# Patient Record
Sex: FEMALE | Race: WHITE | NOT HISPANIC OR LATINO | Employment: OTHER | URBAN - METROPOLITAN AREA
[De-identification: names, ages, dates, MRNs, and addresses within clinical notes are randomized per-mention and may not be internally consistent; named-entity substitution may affect disease eponyms.]

---

## 2017-01-04 ENCOUNTER — HOSPITAL ENCOUNTER (OUTPATIENT)
Dept: ONCOLOGY | Facility: CLINIC | Age: 62
Discharge: HOME | End: 2017-01-04
Attending: INTERNAL MEDICINE | Admitting: INTERNAL MEDICINE

## 2017-02-01 ENCOUNTER — HOSPITAL ENCOUNTER (OUTPATIENT)
Dept: ONCOLOGY | Facility: CLINIC | Age: 62
Discharge: HOME | End: 2017-02-01
Attending: INTERNAL MEDICINE | Admitting: INTERNAL MEDICINE

## 2017-03-02 ENCOUNTER — HOSPITAL ENCOUNTER (OUTPATIENT)
Dept: ONCOLOGY | Facility: CLINIC | Age: 62
Discharge: HOME | End: 2017-03-02
Attending: INTERNAL MEDICINE | Admitting: INTERNAL MEDICINE

## 2017-03-02 ENCOUNTER — HOSPITAL ENCOUNTER (OUTPATIENT)
Dept: GENERAL RADIOLOGY | Facility: HOSPITAL | Age: 62
Discharge: HOME | End: 2017-03-02
Attending: INTERNAL MEDICINE | Admitting: INTERNAL MEDICINE

## 2017-04-06 ENCOUNTER — HOSPITAL ENCOUNTER (OUTPATIENT)
Dept: ONCOLOGY | Facility: CLINIC | Age: 62
Discharge: HOME | End: 2017-04-06
Attending: INTERNAL MEDICINE | Admitting: INTERNAL MEDICINE

## 2017-05-02 ENCOUNTER — HOSPITAL ENCOUNTER (OUTPATIENT)
Dept: OTHER | Facility: HOSPITAL | Age: 62
Setting detail: SPECIMEN
Discharge: HOME | End: 2017-05-02
Attending: INTERNAL MEDICINE | Admitting: INTERNAL MEDICINE

## 2017-05-04 ENCOUNTER — HOSPITAL ENCOUNTER (OUTPATIENT)
Dept: ONCOLOGY | Facility: CLINIC | Age: 62
Discharge: HOME | End: 2017-05-04
Attending: NURSE PRACTITIONER | Admitting: NURSE PRACTITIONER

## 2017-06-08 ENCOUNTER — HOSPITAL ENCOUNTER (OUTPATIENT)
Dept: ONCOLOGY | Facility: CLINIC | Age: 62
Discharge: HOME | End: 2017-06-08
Attending: INTERNAL MEDICINE | Admitting: INTERNAL MEDICINE

## 2017-07-06 ENCOUNTER — HOSPITAL ENCOUNTER (OUTPATIENT)
Dept: ONCOLOGY | Facility: CLINIC | Age: 62
Discharge: HOME | End: 2017-07-06
Attending: INTERNAL MEDICINE | Admitting: INTERNAL MEDICINE

## 2017-08-03 ENCOUNTER — HOSPITAL ENCOUNTER (OUTPATIENT)
Dept: ONCOLOGY | Facility: CLINIC | Age: 62
Discharge: HOME | End: 2017-08-03
Attending: NURSE PRACTITIONER | Admitting: NURSE PRACTITIONER

## 2017-08-03 LAB
ALBUMIN SERPL-MCNC: 3.7 G/DL (ref 3.5–4.8)
ALBUMIN/GLOB SERPL: 1.2 {RATIO} (ref 1–1.7)
ALP SERPL-CCNC: 66 IU/L (ref 32–91)
ALT SERPL-CCNC: 19 IU/L (ref 14–54)
ANION GAP SERPL CALC-SCNC: 13.8 MMOL/L (ref 10–20)
AST SERPL-CCNC: 25 IU/L (ref 15–41)
BILIRUB SERPL-MCNC: 0.5 MG/DL (ref 0.3–1.2)
BUN SERPL-MCNC: 18 MG/DL (ref 8–20)
BUN/CREAT SERPL: 15 (ref 5.4–26.2)
CALCIUM SERPL-MCNC: 9.3 MG/DL (ref 8.9–10.3)
CHLORIDE SERPL-SCNC: 108 MMOL/L (ref 101–111)
CONV CO2: 23 MMOL/L (ref 22–32)
CONV TOTAL PROTEIN: 6.7 G/DL (ref 6.1–7.9)
CREAT UR-MCNC: 1.2 MG/DL (ref 0.4–1)
GLOBULIN UR ELPH-MCNC: 3 G/DL (ref 2.5–3.8)
GLUCOSE SERPL-MCNC: 185 MG/DL (ref 65–99)
POTASSIUM SERPL-SCNC: 3.8 MMOL/L (ref 3.6–5.1)
SODIUM SERPL-SCNC: 141 MMOL/L (ref 136–144)

## 2017-08-18 ENCOUNTER — HOSPITAL ENCOUNTER (OUTPATIENT)
Dept: MAMMOGRAPHY | Facility: HOSPITAL | Age: 62
Discharge: HOME | End: 2017-08-18
Attending: NURSE PRACTITIONER | Admitting: NURSE PRACTITIONER

## 2017-09-27 ENCOUNTER — HOSPITAL ENCOUNTER (OUTPATIENT)
Dept: ONCOLOGY | Facility: CLINIC | Age: 62
Setting detail: INFUSION SERIES
Discharge: HOME | End: 2017-09-27
Attending: INTERNAL MEDICINE | Admitting: INTERNAL MEDICINE

## 2017-09-27 ENCOUNTER — HOSPITAL ENCOUNTER (OUTPATIENT)
Dept: ONCOLOGY | Facility: HOSPITAL | Age: 62
Discharge: HOME | End: 2017-09-27
Attending: INTERNAL MEDICINE | Admitting: INTERNAL MEDICINE

## 2017-09-27 ENCOUNTER — CLINICAL SUPPORT (OUTPATIENT)
Dept: ONCOLOGY | Facility: HOSPITAL | Age: 62
End: 2017-09-27

## 2017-09-27 NOTE — PROGRESS NOTES
PATIENTS ONCOLOGY RECORD LOCATED IN [facility]      Subjective     Name:  PENELOPE MIRELES     Date:  2017  Address:  [address] [place], [place] [address] [place] [address]  Home: [phone]  :  1955 AGE:  62 y.o.        RECORDS OBTAINED:  Patients Oncology Record is located in [facility]

## 2017-10-25 ENCOUNTER — HOSPITAL ENCOUNTER (OUTPATIENT)
Dept: ONCOLOGY | Facility: CLINIC | Age: 62
Setting detail: INFUSION SERIES
Discharge: HOME | End: 2017-10-25
Attending: INTERNAL MEDICINE | Admitting: INTERNAL MEDICINE

## 2017-10-25 ENCOUNTER — CLINICAL SUPPORT (OUTPATIENT)
Dept: ONCOLOGY | Facility: HOSPITAL | Age: 62
End: 2017-10-25

## 2017-10-25 ENCOUNTER — HOSPITAL ENCOUNTER (OUTPATIENT)
Dept: ONCOLOGY | Facility: HOSPITAL | Age: 62
Discharge: HOME | End: 2017-10-25
Attending: INTERNAL MEDICINE | Admitting: INTERNAL MEDICINE

## 2017-10-25 NOTE — PROGRESS NOTES
PATIENTS ONCOLOGY RECORD LOCATED IN [facility]      Subjective     Name:  PENELOPE MIRELES     Date:  10/25/2017  Address:  [address] [place] [address]  Home: [phone]  :  1955 AGE:  62 y.o.        RECORDS OBTAINED:  Patients Oncology Record is located in [facility]

## 2017-10-27 ENCOUNTER — HOSPITAL ENCOUNTER (OUTPATIENT)
Dept: ULTRASOUND IMAGING | Facility: HOSPITAL | Age: 62
Discharge: HOME | End: 2017-10-27
Attending: OBSTETRICS & GYNECOLOGY | Admitting: OBSTETRICS & GYNECOLOGY

## 2017-11-21 ENCOUNTER — CLINICAL SUPPORT (OUTPATIENT)
Dept: ONCOLOGY | Facility: HOSPITAL | Age: 62
End: 2017-11-21

## 2017-11-21 ENCOUNTER — HOSPITAL ENCOUNTER (OUTPATIENT)
Dept: ONCOLOGY | Facility: CLINIC | Age: 62
Setting detail: INFUSION SERIES
Discharge: HOME | End: 2017-11-21
Attending: INTERNAL MEDICINE | Admitting: INTERNAL MEDICINE

## 2017-11-21 ENCOUNTER — HOSPITAL ENCOUNTER (OUTPATIENT)
Dept: ONCOLOGY | Facility: HOSPITAL | Age: 62
Discharge: HOME | End: 2017-11-21
Attending: INTERNAL MEDICINE | Admitting: INTERNAL MEDICINE

## 2017-11-21 NOTE — PROGRESS NOTES
PATIENTS ONCOLOGY RECORD LOCATED IN [facility]      Subjective     Name:  PENELOPE MIRELES     Date:  2017  Address:  [address] [place], [place] [address] [place] [address]  Home: [phone]  :  1955 AGE:  62 y.o.        RECORDS OBTAINED:  Patients Oncology Record is located in [facility]

## 2017-12-07 ENCOUNTER — HOSPITAL ENCOUNTER (OUTPATIENT)
Dept: OTHER | Facility: HOSPITAL | Age: 62
Discharge: HOME | End: 2017-12-07
Attending: OBSTETRICS & GYNECOLOGY | Admitting: OBSTETRICS & GYNECOLOGY

## 2017-12-07 LAB
ABO + RH BLD: NORMAL
ALBUMIN SERPL-MCNC: 3.7 G/DL (ref 3.5–4.8)
ALBUMIN/GLOB SERPL: 1.2 {RATIO} (ref 1–1.7)
ALP SERPL-CCNC: 67 IU/L (ref 32–91)
ALT SERPL-CCNC: 23 IU/L (ref 14–54)
ANION GAP SERPL CALC-SCNC: 10.9 MMOL/L (ref 10–20)
ARMBAND: NORMAL
AST SERPL-CCNC: 24 IU/L (ref 15–41)
BASOPHILS # BLD AUTO: 0.1 10*3/UL (ref 0–0.2)
BASOPHILS NFR BLD AUTO: 1 % (ref 0–2)
BILIRUB SERPL-MCNC: 0.3 MG/DL (ref 0.3–1.2)
BILIRUB UR QL STRIP: NEGATIVE MG/DL
BLD COMPONENT TYPE: NORMAL
BLD GP AB SCN SERPL QL: NEGATIVE
BUN SERPL-MCNC: 16 MG/DL (ref 8–20)
BUN/CREAT SERPL: 16 (ref 5.4–26.2)
CALCIUM SERPL-MCNC: 9.1 MG/DL (ref 8.9–10.3)
CASTS URNS QL MICRO: NORMAL /[LPF]
CHLORIDE SERPL-SCNC: 107 MMOL/L (ref 101–111)
COLOR UR: YELLOW
CONV BACTERIA IN URINE MICRO: NEGATIVE
CONV CLARITY OF URINE: CLEAR
CONV CO2: 26 MMOL/L (ref 22–32)
CONV HYALINE CASTS IN URINE MICRO: 4 /[LPF] (ref 0–5)
CONV PROTEIN IN URINE BY AUTOMATED TEST STRIP: NEGATIVE MG/DL
CONV SMALL ROUND CELLS: NORMAL /[HPF]
CONV TOTAL PROTEIN: 6.9 G/DL (ref 6.1–7.9)
CONV UROBILINOGEN IN URINE BY AUTOMATED TEST STRIP: 0.2 MG/DL
CREAT UR-MCNC: 1 MG/DL (ref 0.4–1)
CROSSMATCH EXPIRATION: NORMAL
CULTURE INDICATED?: NORMAL
DIFFERENTIAL METHOD BLD: (no result)
EOSINOPHIL # BLD AUTO: 0.1 10*3/UL (ref 0–0.3)
EOSINOPHIL # BLD AUTO: 1 % (ref 0–3)
ERYTHROCYTE [DISTWIDTH] IN BLOOD BY AUTOMATED COUNT: 14.8 % (ref 11.5–14.5)
GLOBULIN UR ELPH-MCNC: 3.2 G/DL (ref 2.5–3.8)
GLUCOSE SERPL-MCNC: 123 MG/DL (ref 65–99)
GLUCOSE UR QL: NEGATIVE MG/DL
HCT VFR BLD AUTO: 39.8 % (ref 35–49)
HGB BLD-MCNC: 13.4 G/DL (ref 12–15)
HGB UR QL STRIP: NEGATIVE
KETONES UR QL STRIP: NEGATIVE MG/DL
LEUKOCYTE ESTERASE UR QL STRIP: NEGATIVE
LYMPHOCYTES # BLD AUTO: 1 10*3/UL (ref 0.8–4.8)
LYMPHOCYTES NFR BLD AUTO: 16 % (ref 18–42)
MCH RBC QN AUTO: 29.3 PG (ref 26–32)
MCHC RBC AUTO-ENTMCNC: 33.7 G/DL (ref 32–36)
MCV RBC AUTO: 86.9 FL (ref 80–94)
MONOCYTES # BLD AUTO: 0.4 10*3/UL (ref 0.1–1.3)
MONOCYTES NFR BLD AUTO: 7 % (ref 2–11)
NEUTROPHILS # BLD AUTO: 4.4 10*3/UL (ref 2.3–8.6)
NEUTROPHILS NFR BLD AUTO: 75 % (ref 50–75)
NITRITE UR QL STRIP: NEGATIVE
NRBC BLD AUTO-RTO: 0 /100{WBCS}
NRBC/RBC NFR BLD MANUAL: 0 10*3/UL
PH UR STRIP.AUTO: 5 [PH] (ref 4.5–8)
PLATELET # BLD AUTO: 278 10*3/UL (ref 150–450)
PMV BLD AUTO: 7.7 FL (ref 7.4–10.4)
POTASSIUM SERPL-SCNC: 3.9 MMOL/L (ref 3.6–5.1)
RBC # BLD AUTO: 4.58 10*6/UL (ref 4–5.4)
RBC #/AREA URNS HPF: 1 /[HPF] (ref 0–3)
SODIUM SERPL-SCNC: 140 MMOL/L (ref 136–144)
SP GR UR: 1.03 (ref 1–1.03)
SPERM URNS QL MICRO: NORMAL /[HPF]
SQUAMOUS SPT QL MICRO: 5 /[HPF] (ref 0–5)
UNIDENT CRYS URNS QL MICRO: NORMAL /[HPF]
WBC # BLD AUTO: 5.9 10*3/UL (ref 4.5–11.5)
WBC #/AREA URNS HPF: 2 /[HPF] (ref 0–5)
YEAST SPEC QL WET PREP: NORMAL /[HPF]

## 2017-12-18 ENCOUNTER — HOSPITAL ENCOUNTER (OUTPATIENT)
Dept: OTHER | Facility: HOSPITAL | Age: 62
Setting detail: SPECIMEN
Discharge: HOME | End: 2017-12-18
Attending: OBSTETRICS & GYNECOLOGY | Admitting: OBSTETRICS & GYNECOLOGY

## 2017-12-19 ENCOUNTER — HOSPITAL ENCOUNTER (OUTPATIENT)
Dept: OTHER | Facility: HOSPITAL | Age: 62
Setting detail: SPECIMEN
Discharge: HOME | End: 2017-12-19
Attending: OBSTETRICS & GYNECOLOGY | Admitting: OBSTETRICS & GYNECOLOGY

## 2017-12-19 LAB
ABS VARIANT LYMPHS: 0.08 10*3/UL
CONV ABS BANDS: 0.3 10*3/UL
CONV ABS IMM GRAN: 0.17 10*3/UL
CONV ANISOCYTES: SLIGHT
CONV POLYCHROMASIA IN BLOOD BY LIGHT MICROSCOPY: SLIGHT
CONV VARIANT LYMPHOCYTES RELATIVE PERCENT BY MANUAL COUNT: 1 % (ref 0–1)
DIFFERENTIAL METHOD BLD: (no result)
ERYTHROCYTE [DISTWIDTH] IN BLOOD BY AUTOMATED COUNT: 15 % (ref 11.5–14.5)
HCT VFR BLD AUTO: 37.5 % (ref 35–49)
HGB BLD-MCNC: 12.2 G/DL (ref 12–15)
LYMPHOCYTES # BLD AUTO: 1.4 10*3/UL (ref 0.8–4.8)
LYMPHOCYTES NFR BLD AUTO: 17 % (ref 18–42)
MCH RBC QN AUTO: 28.6 PG (ref 26–32)
MCHC RBC AUTO-ENTMCNC: 32.5 G/DL (ref 32–36)
MCV RBC AUTO: 88.2 FL (ref 80–94)
METAMYELOCYTES NFR BLD: 2 %
MONOCYTES # BLD AUTO: 0.3 10*3/UL (ref 0.1–1.3)
MONOCYTES NFR BLD AUTO: 3 % (ref 2–11)
NEUTROPHILS # BLD AUTO: 6.1 10*3/UL (ref 2.3–8.6)
NEUTROPHILS NFR BLD AUTO: 73 % (ref 50–75)
NEUTS BAND NFR BLD MANUAL: 4 % (ref 0–5)
PLATELET # BLD AUTO: 283 10*3/UL (ref 150–450)
PMV BLD AUTO: 8.4 FL (ref 7.4–10.4)
RBC # BLD AUTO: 4.25 10*6/UL (ref 4–5.4)
WBC # BLD AUTO: 8.4 10*3/UL (ref 4.5–11.5)

## 2017-12-21 ENCOUNTER — HOSPITAL ENCOUNTER (OUTPATIENT)
Dept: ONCOLOGY | Facility: HOSPITAL | Age: 62
Discharge: HOME | End: 2017-12-21
Attending: INTERNAL MEDICINE | Admitting: INTERNAL MEDICINE

## 2017-12-21 ENCOUNTER — HOSPITAL ENCOUNTER (OUTPATIENT)
Dept: ONCOLOGY | Facility: CLINIC | Age: 62
Setting detail: INFUSION SERIES
Discharge: HOME | End: 2017-12-21
Attending: INTERNAL MEDICINE | Admitting: INTERNAL MEDICINE

## 2017-12-21 ENCOUNTER — CLINICAL SUPPORT (OUTPATIENT)
Dept: ONCOLOGY | Facility: HOSPITAL | Age: 62
End: 2017-12-21

## 2018-01-18 ENCOUNTER — HOSPITAL ENCOUNTER (OUTPATIENT)
Dept: ONCOLOGY | Facility: CLINIC | Age: 63
Setting detail: INFUSION SERIES
Discharge: HOME | End: 2018-01-18
Attending: INTERNAL MEDICINE | Admitting: INTERNAL MEDICINE

## 2018-01-18 ENCOUNTER — CLINICAL SUPPORT (OUTPATIENT)
Dept: ONCOLOGY | Facility: HOSPITAL | Age: 63
End: 2018-01-18

## 2018-01-18 NOTE — PROGRESS NOTES
PATIENTS ONCOLOGY RECORD LOCATED IN [facility]      Subjective     Name:  PENELOPE MIRELES     Date:  2018  [address] [place] [address]  Home: [phone]  :  1955 AGE:  62 y.o.        RECORDS OBTAINED:  Patients Oncology Record is located in [facility]

## 2018-02-20 ENCOUNTER — HOSPITAL ENCOUNTER (OUTPATIENT)
Dept: ONCOLOGY | Facility: CLINIC | Age: 63
Setting detail: INFUSION SERIES
Discharge: HOME | End: 2018-02-20
Attending: INTERNAL MEDICINE | Admitting: INTERNAL MEDICINE

## 2018-02-20 ENCOUNTER — CLINICAL SUPPORT (OUTPATIENT)
Dept: ONCOLOGY | Facility: HOSPITAL | Age: 63
End: 2018-02-20

## 2018-02-20 NOTE — PROGRESS NOTES
PATIENTS ONCOLOGY RECORD LOCATED IN [facility]      Subjective     Name:  PENELOPE MIRELES     Date:  2018  Address:  [address] [place], [place] [address] [place] [address]  Home: [phone]  :  1955 AGE:  62 y.o.        RECORDS OBTAINED:  Patients Oncology Record is located in [facility]

## 2018-03-20 ENCOUNTER — CLINICAL SUPPORT (OUTPATIENT)
Dept: ONCOLOGY | Facility: HOSPITAL | Age: 63
End: 2018-03-20

## 2018-03-20 ENCOUNTER — HOSPITAL ENCOUNTER (OUTPATIENT)
Dept: ONCOLOGY | Facility: CLINIC | Age: 63
Setting detail: INFUSION SERIES
Discharge: HOME | End: 2018-03-20
Attending: NURSE PRACTITIONER | Admitting: NURSE PRACTITIONER

## 2018-03-20 ENCOUNTER — HOSPITAL ENCOUNTER (OUTPATIENT)
Dept: ONCOLOGY | Facility: HOSPITAL | Age: 63
Discharge: HOME | End: 2018-03-20
Attending: NURSE PRACTITIONER | Admitting: NURSE PRACTITIONER

## 2018-03-20 NOTE — PROGRESS NOTES
PATIENTS ONCOLOGY RECORD LOCATED IN [facility]      Subjective     Name:  PENELOPE MIRELES     Date:  2018  Address:  [address] [place], [place] [address] [place] [address]  Home: [phone]  :  1955 AGE:  62 y.o.        RECORDS OBTAINED:  Patients Oncology Record is located in [facility]

## 2018-04-24 ENCOUNTER — HOSPITAL ENCOUNTER (OUTPATIENT)
Dept: ONCOLOGY | Facility: CLINIC | Age: 63
Setting detail: INFUSION SERIES
Discharge: HOME | End: 2018-04-24
Attending: INTERNAL MEDICINE | Admitting: INTERNAL MEDICINE

## 2018-05-03 ENCOUNTER — HOSPITAL ENCOUNTER (OUTPATIENT)
Dept: GENERAL RADIOLOGY | Facility: HOSPITAL | Age: 63
Discharge: HOME | End: 2018-05-03
Attending: FAMILY MEDICINE | Admitting: FAMILY MEDICINE

## 2018-05-22 ENCOUNTER — HOSPITAL ENCOUNTER (OUTPATIENT)
Dept: ONCOLOGY | Facility: CLINIC | Age: 63
Setting detail: INFUSION SERIES
Discharge: HOME | End: 2018-05-22
Attending: INTERNAL MEDICINE | Admitting: INTERNAL MEDICINE

## 2018-05-22 ENCOUNTER — CLINICAL SUPPORT (OUTPATIENT)
Dept: ONCOLOGY | Facility: HOSPITAL | Age: 63
End: 2018-05-22

## 2018-05-22 NOTE — PROGRESS NOTES
PATIENTS ONCOLOGY RECORD LOCATED IN [facility]      Subjective     Name:  PENELOPE MIRELES     Date:  2018  Address:  [address] [place] [address] [place] [address]  Home: [phone]  :  1955 AGE:  63 y.o.        RECORDS OBTAINED:  Patients Oncology Record is located in [facility]

## 2018-05-31 ENCOUNTER — HOSPITAL ENCOUNTER (OUTPATIENT)
Dept: CARDIOLOGY | Facility: HOSPITAL | Age: 63
Discharge: HOME | End: 2018-05-31

## 2018-06-21 ENCOUNTER — CLINICAL SUPPORT (OUTPATIENT)
Dept: ONCOLOGY | Facility: HOSPITAL | Age: 63
End: 2018-06-21

## 2018-06-21 ENCOUNTER — HOSPITAL ENCOUNTER (OUTPATIENT)
Dept: ONCOLOGY | Facility: CLINIC | Age: 63
Setting detail: INFUSION SERIES
Discharge: HOME | End: 2018-06-21
Attending: INTERNAL MEDICINE | Admitting: INTERNAL MEDICINE

## 2018-06-21 NOTE — PROGRESS NOTES
PATIENTS ONCOLOGY RECORD LOCATED IN [facility]      Subjective     Name:  PENELOPE MIRELES     Date:  2018  Address:  [address] [place], [place] [address] [place] [address]  Home: [phone]  :  1955 AGE:  63 y.o.        RECORDS OBTAINED:  Patients Oncology Record is located in [facility]

## 2018-08-28 ENCOUNTER — HOSPITAL ENCOUNTER (OUTPATIENT)
Dept: MAMMOGRAPHY | Facility: HOSPITAL | Age: 63
Discharge: HOME | End: 2018-08-28

## 2018-08-29 ENCOUNTER — CLINICAL SUPPORT (OUTPATIENT)
Dept: ONCOLOGY | Facility: HOSPITAL | Age: 63
End: 2018-08-29

## 2018-08-29 ENCOUNTER — HOSPITAL ENCOUNTER (OUTPATIENT)
Dept: ONCOLOGY | Facility: CLINIC | Age: 63
Setting detail: INFUSION SERIES
Discharge: HOME | End: 2018-08-29
Attending: INTERNAL MEDICINE | Admitting: INTERNAL MEDICINE

## 2018-08-29 NOTE — PROGRESS NOTES
PATIENTS ONCOLOGY RECORD LOCATED IN [facility]      Subjective     Name:  PENELOPE MIRELES     Date:  2018  Address:  [address] [place], [place] [address] [place] [address]  Home: [phone]  :  1955 AGE:  63 y.o.        RECORDS OBTAINED:  Patients Oncology Record is located in [facility]

## 2018-09-18 ENCOUNTER — HOSPITAL ENCOUNTER (OUTPATIENT)
Dept: PREADMISSION TESTING | Facility: HOSPITAL | Age: 63
Discharge: HOME | End: 2018-09-18
Attending: SURGERY | Admitting: SURGERY

## 2018-09-18 LAB
ANION GAP SERPL CALC-SCNC: 15.8 MMOL/L (ref 10–20)
BASOPHILS # BLD AUTO: 0.1 10*3/UL (ref 0–0.2)
BASOPHILS NFR BLD AUTO: 1 % (ref 0–2)
BUN SERPL-MCNC: 16 MG/DL (ref 8–20)
BUN/CREAT SERPL: 14.5 (ref 5.4–26.2)
CALCIUM SERPL-MCNC: 9.1 MG/DL (ref 8.9–10.3)
CHLORIDE SERPL-SCNC: 106 MMOL/L (ref 101–111)
CONV CO2: 22 MMOL/L (ref 22–32)
CREAT UR-MCNC: 1.1 MG/DL (ref 0.4–1)
DIFFERENTIAL METHOD BLD: (no result)
EOSINOPHIL # BLD AUTO: 0.1 10*3/UL (ref 0–0.3)
EOSINOPHIL # BLD AUTO: 1 % (ref 0–3)
ERYTHROCYTE [DISTWIDTH] IN BLOOD BY AUTOMATED COUNT: 14.6 % (ref 11.5–14.5)
GLUCOSE SERPL-MCNC: 180 MG/DL (ref 65–99)
HCT VFR BLD AUTO: 41 % (ref 35–49)
HGB BLD-MCNC: 13.3 G/DL (ref 12–15)
LYMPHOCYTES # BLD AUTO: 1.2 10*3/UL (ref 0.8–4.8)
LYMPHOCYTES NFR BLD AUTO: 17 % (ref 18–42)
MCH RBC QN AUTO: 28.3 PG (ref 26–32)
MCHC RBC AUTO-ENTMCNC: 32.5 G/DL (ref 32–36)
MCV RBC AUTO: 87 FL (ref 80–94)
MONOCYTES # BLD AUTO: 0.4 10*3/UL (ref 0.1–1.3)
MONOCYTES NFR BLD AUTO: 5 % (ref 2–11)
NEUTROPHILS # BLD AUTO: 5.5 10*3/UL (ref 2.3–8.6)
NEUTROPHILS NFR BLD AUTO: 76 % (ref 50–75)
NRBC BLD AUTO-RTO: 0 /100{WBCS}
NRBC/RBC NFR BLD MANUAL: 0 10*3/UL
PLATELET # BLD AUTO: 307 10*3/UL (ref 150–450)
PMV BLD AUTO: 7.5 FL (ref 7.4–10.4)
POTASSIUM SERPL-SCNC: 3.8 MMOL/L (ref 3.6–5.1)
RBC # BLD AUTO: 4.71 10*6/UL (ref 4–5.4)
SODIUM SERPL-SCNC: 140 MMOL/L (ref 136–144)
WBC # BLD AUTO: 7.2 10*3/UL (ref 4.5–11.5)

## 2018-09-26 ENCOUNTER — HOSPITAL ENCOUNTER (OUTPATIENT)
Dept: PREOP | Facility: HOSPITAL | Age: 63
Setting detail: HOSPITAL OUTPATIENT SURGERY
Discharge: HOME | End: 2018-09-26
Attending: SURGERY | Admitting: SURGERY

## 2018-09-26 LAB — GLUCOSE BLD-MCNC: 114 MG/DL (ref 70–105)

## 2019-02-27 ENCOUNTER — HOSPITAL ENCOUNTER (OUTPATIENT)
Dept: ONCOLOGY | Facility: HOSPITAL | Age: 64
Discharge: HOME | End: 2019-02-27

## 2019-02-27 ENCOUNTER — HOSPITAL ENCOUNTER (OUTPATIENT)
Dept: ONCOLOGY | Facility: CLINIC | Age: 64
Setting detail: INFUSION SERIES
Discharge: HOME | End: 2019-02-27
Attending: INTERNAL MEDICINE | Admitting: INTERNAL MEDICINE

## 2019-06-01 ENCOUNTER — TRANSCRIBE ORDERS (OUTPATIENT)
Dept: ADMINISTRATIVE | Facility: HOSPITAL | Age: 64
End: 2019-06-01

## 2019-06-01 DIAGNOSIS — C50.411 MALIGNANT NEOPLASM OF UPPER-OUTER QUADRANT OF RIGHT FEMALE BREAST, UNSPECIFIED ESTROGEN RECEPTOR STATUS (HCC): Primary | ICD-10-CM

## 2019-06-01 DIAGNOSIS — M25.511 RIGHT SHOULDER PAIN, UNSPECIFIED CHRONICITY: ICD-10-CM

## 2019-06-01 DIAGNOSIS — G47.00 INSOMNIA, UNSPECIFIED TYPE: ICD-10-CM

## 2019-06-01 DIAGNOSIS — C64.1 MALIGNANT NEOPLASM OF RIGHT KIDNEY, EXCEPT RENAL PELVIS (HCC): ICD-10-CM

## 2019-06-21 ENCOUNTER — TELEPHONE (OUTPATIENT)
Dept: FAMILY MEDICINE CLINIC | Facility: CLINIC | Age: 64
End: 2019-06-21

## 2019-06-21 ENCOUNTER — OFFICE VISIT (OUTPATIENT)
Dept: FAMILY MEDICINE CLINIC | Facility: CLINIC | Age: 64
End: 2019-06-21

## 2019-06-21 VITALS
SYSTOLIC BLOOD PRESSURE: 137 MMHG | OXYGEN SATURATION: 96 % | BODY MASS INDEX: 42.3 KG/M2 | HEART RATE: 83 BPM | DIASTOLIC BLOOD PRESSURE: 85 MMHG | TEMPERATURE: 98 F | RESPIRATION RATE: 17 BRPM | HEIGHT: 64 IN | WEIGHT: 247.8 LBS

## 2019-06-21 DIAGNOSIS — G89.29 CHRONIC PAIN OF BOTH KNEES: ICD-10-CM

## 2019-06-21 DIAGNOSIS — C64.1 CANCER OF KIDNEY, RIGHT (HCC): ICD-10-CM

## 2019-06-21 DIAGNOSIS — M25.561 CHRONIC PAIN OF BOTH KNEES: ICD-10-CM

## 2019-06-21 DIAGNOSIS — M54.41 CHRONIC BILATERAL LOW BACK PAIN WITH BILATERAL SCIATICA: ICD-10-CM

## 2019-06-21 DIAGNOSIS — M25.561 CHRONIC PAIN OF RIGHT KNEE: ICD-10-CM

## 2019-06-21 DIAGNOSIS — M79.89 LEG SWELLING: ICD-10-CM

## 2019-06-21 DIAGNOSIS — G89.29 CHRONIC PAIN OF RIGHT KNEE: ICD-10-CM

## 2019-06-21 DIAGNOSIS — H93.13 TINNITUS OF BOTH EARS: Primary | ICD-10-CM

## 2019-06-21 DIAGNOSIS — M54.42 CHRONIC BILATERAL LOW BACK PAIN WITH BILATERAL SCIATICA: ICD-10-CM

## 2019-06-21 DIAGNOSIS — C50.919 MALIGNANT NEOPLASM OF FEMALE BREAST, UNSPECIFIED ESTROGEN RECEPTOR STATUS, UNSPECIFIED LATERALITY, UNSPECIFIED SITE OF BREAST (HCC): ICD-10-CM

## 2019-06-21 DIAGNOSIS — C50.411 MALIGNANT NEOPLASM OF UPPER-OUTER QUADRANT OF RIGHT FEMALE BREAST, UNSPECIFIED ESTROGEN RECEPTOR STATUS (HCC): ICD-10-CM

## 2019-06-21 DIAGNOSIS — G89.29 CHRONIC BILATERAL LOW BACK PAIN WITH BILATERAL SCIATICA: ICD-10-CM

## 2019-06-21 DIAGNOSIS — M25.562 CHRONIC PAIN OF BOTH KNEES: ICD-10-CM

## 2019-06-21 PROBLEM — D64.9 ANEMIA: Status: ACTIVE | Noted: 2019-06-21

## 2019-06-21 PROBLEM — M54.2 NECK PAIN, ACUTE: Status: ACTIVE | Noted: 2019-06-21

## 2019-06-21 PROBLEM — M79.604 RIGHT LEG PAIN: Status: ACTIVE | Noted: 2019-06-21

## 2019-06-21 PROBLEM — M47.27 OSTEOARTHRITIS OF LUMBOSACRAL SPINE WITH RADICULOPATHY: Status: ACTIVE | Noted: 2018-08-15

## 2019-06-21 PROBLEM — M19.90 ARTHRITIS: Status: ACTIVE | Noted: 2019-06-21

## 2019-06-21 PROBLEM — M47.812 CERVICAL SPONDYLOSIS WITHOUT MYELOPATHY: Status: ACTIVE | Noted: 2018-08-15

## 2019-06-21 PROBLEM — M54.50 LOW BACK PAIN: Status: ACTIVE | Noted: 2019-06-21

## 2019-06-21 PROBLEM — Z23 NEED FOR IMMUNIZATION AGAINST INFLUENZA: Status: RESOLVED | Noted: 2019-06-21 | Resolved: 2019-06-21

## 2019-06-21 PROBLEM — H10.9 CONJUNCTIVITIS: Status: ACTIVE | Noted: 2017-04-15

## 2019-06-21 PROBLEM — Z23 NEED FOR IMMUNIZATION AGAINST INFLUENZA: Status: ACTIVE | Noted: 2019-06-21

## 2019-06-21 PROCEDURE — 99214 OFFICE O/P EST MOD 30 MIN: CPT | Performed by: FAMILY MEDICINE

## 2019-06-21 PROCEDURE — 20610 DRAIN/INJ JOINT/BURSA W/O US: CPT | Performed by: FAMILY MEDICINE

## 2019-06-21 RX ORDER — METHYLPREDNISOLONE ACETATE 80 MG/ML
80 INJECTION, SUSPENSION INTRA-ARTICULAR; INTRALESIONAL; INTRAMUSCULAR; SOFT TISSUE ONCE
Status: COMPLETED | OUTPATIENT
Start: 2019-06-21 | End: 2019-06-21

## 2019-06-21 RX ORDER — GABAPENTIN 300 MG/1
1 CAPSULE ORAL 3 TIMES DAILY
COMMUNITY
Start: 2016-10-27 | End: 2019-07-17

## 2019-06-21 RX ORDER — LIDOCAINE HYDROCHLORIDE 10 MG/ML
1 INJECTION, SOLUTION INFILTRATION; PERINEURAL ONCE
Status: COMPLETED | OUTPATIENT
Start: 2019-06-21 | End: 2019-06-21

## 2019-06-21 RX ORDER — BUPIVACAINE HYDROCHLORIDE 5 MG/ML
1 INJECTION, SOLUTION PERINEURAL ONCE
Status: COMPLETED | OUTPATIENT
Start: 2019-06-21 | End: 2019-06-21

## 2019-06-21 RX ADMIN — LIDOCAINE HYDROCHLORIDE 1 ML: 10 INJECTION, SOLUTION INFILTRATION; PERINEURAL at 11:49

## 2019-06-21 RX ADMIN — METHYLPREDNISOLONE ACETATE 80 MG: 80 INJECTION, SUSPENSION INTRA-ARTICULAR; INTRALESIONAL; INTRAMUSCULAR; SOFT TISSUE at 11:50

## 2019-06-21 RX ADMIN — BUPIVACAINE HYDROCHLORIDE 1 ML: 5 INJECTION, SOLUTION PERINEURAL at 11:46

## 2019-06-21 NOTE — ASSESSMENT & PLAN NOTE
Renal condition is unchanged.  Continue current treatment regimen.  Regular aerobic exercise.  Renal condition will be reassessed in 3 months.

## 2019-06-21 NOTE — PROGRESS NOTES
Procedure   Arthrocentesis  Date/Time: 6/21/2019 11:57 AM  Performed by: Yonathan Camargo Sr., MD  Authorized by: Yonathan Camargo Sr., MD   Indications: joint swelling and pain   Body area: knee  Joint: right knee  Local anesthesia used: no    Anesthesia:  Local anesthesia used: no    Sedation:  Patient sedated: no    Needle gauge: 25G by 1.5in.  Ultrasound guidance: no  Approach: medial  Patient tolerance: Patient tolerated the procedure well with no immediate complications

## 2019-06-22 NOTE — ASSESSMENT & PLAN NOTE
Last chemo was Jan 2016 and Feb was last radiation.   She will be following up  She sees Dr. Charles seen Q3 months.  She has had a mammogram that was normal.  She stopped taking her medication due to the side effects.  She was strongly advised to restart her medicine.

## 2019-06-22 NOTE — ASSESSMENT & PLAN NOTE
Her swelling on her legs are about the same and there is no pain.  This is likely just edema due to poor circulation.   She was given an injection in the right knee.

## 2019-06-22 NOTE — ASSESSMENT & PLAN NOTE
This is likely her hearing worsening.  She was encouraged to be seen by ENT but she declined at this time.

## 2019-06-22 NOTE — ASSESSMENT & PLAN NOTE
She was advised to call and set up an appt with Dr. Galaviz for her back pain and consider an MRI.  She was given back exercises and encouraged to use heat on her back.

## 2019-07-17 ENCOUNTER — OFFICE VISIT (OUTPATIENT)
Dept: ORTHOPEDIC SURGERY | Facility: CLINIC | Age: 64
End: 2019-07-17

## 2019-07-17 VITALS
WEIGHT: 246.2 LBS | DIASTOLIC BLOOD PRESSURE: 83 MMHG | HEIGHT: 64 IN | BODY MASS INDEX: 42.03 KG/M2 | HEART RATE: 57 BPM | SYSTOLIC BLOOD PRESSURE: 133 MMHG

## 2019-07-17 DIAGNOSIS — M47.26 OTHER SPONDYLOSIS WITH RADICULOPATHY, LUMBAR REGION: Primary | ICD-10-CM

## 2019-07-17 PROCEDURE — 99213 OFFICE O/P EST LOW 20 MIN: CPT | Performed by: PHYSICIAN ASSISTANT

## 2019-07-17 RX ORDER — LETROZOLE 2.5 MG/1
2.5 TABLET, FILM COATED ORAL DAILY
Refills: 3 | COMMUNITY
Start: 2019-06-21 | End: 2019-08-12 | Stop reason: SINTOL

## 2019-07-17 RX ORDER — GABAPENTIN 300 MG/1
300 CAPSULE ORAL 2 TIMES DAILY
Qty: 60 CAPSULE | Refills: 0 | Status: SHIPPED | OUTPATIENT
Start: 2019-07-17 | End: 2019-07-24

## 2019-07-17 NOTE — PROGRESS NOTES
Orthopedic Spine Follow Up    Referring Provider: No ref. provider found  Primary Care Provider: Yonathan Camargo Sr., MD    Patient Name: Celi Ordaz  Patient Age: 64 y.o.  Chief Complaint: had concerns including Pain of the Lumbar Spine and Follow-up (Here to get MRI scheduled).    History of Present Illness: Celi Ordaz is a 64 y.o. year old female here in  Follow up today with chief complaint of had concerns including Pain of the Lumbar Spine and Follow-up (Here to get MRI scheduled)..The patient complains of axial low back pain with radiation to right lower extremity for several years.Patient reports no known injury.  Patient has has recurrent self limited episodes of low back pain in the past and previous osteoarthritis of lumbar spine. Pain is described as worsening, intermittent and with weight bearing.  Pain is alleviated by heat and rest.  Previous treatments for this issue include none.  Because of the progressive nature the problem is really wearing her.  Over the last several months she is gotten really bad and 3 months ago she had a bout of severe right buttock and leg pain.  This is improved somewhat but it still there and she can tell she has severe stiffness in the morning, but with little activity worse with overactivity.      Imaging:  We reviewed the lateral x-rays from 2018 today in the office which demonstrate multilevel disc degeneration with profound collapse of L4-5 and L5-S1, backing phenomenon at L4-5 associated facet arthropathy.  No scoliosis.    Assessment:   1. Other spondylosis with radiculopathy, lumbar region        Plan:  Physical therapy, continue gabapentin, follow-up in a month and if pain persists we will order an MRI at that time to plan for epidural injection    Subjective     Review of Systems   Constitutional: Positive for activity change. Negative for fatigue and fever.   HENT: Negative for sore throat.    Eyes: Negative for double vision.   Respiratory: Negative for  choking.    Gastrointestinal: Negative for abdominal pain and constipation.   Musculoskeletal: Positive for back pain and gait problem.   Neurological: Positive for numbness.   Hematological: Does not bruise/bleed easily.   Psychiatric/Behavioral: Positive for sleep disturbance and depressed mood.       The following portions of the patient's history were reviewed and updated as appropriate: allergies, current medications, past family history, past medical history, past social history, past surgical history and problem list.    Objective     Physical Exam   Constitutional: She is oriented to person, place, and time. She appears well-developed.   HENT:   Head: Normocephalic.   Eyes: Conjunctivae are normal.   Neck: Normal range of motion.   Pulmonary/Chest: Effort normal.   Musculoskeletal: She exhibits tenderness.        Right hip: Normal.        Left hip: Normal.        Lumbar back: She exhibits decreased range of motion, tenderness and pain.   Neurological: She is oriented to person, place, and time.   Skin: Skin is warm.   Vitals reviewed.    Back Exam     Tenderness   The patient is experiencing tenderness in the lumbar.    Range of Motion   Extension: abnormal   Flexion: abnormal   Lateral bend right: normal   Lateral bend left: normal   Rotation right: normal   Rotation left: normal     Muscle Strength   The patient has normal back strength.    Tests   Straight leg raise right: negative  Straight leg raise left: negative    Other   Toe walk: normal  Heel walk: normal              1. Other spondylosis with radiculopathy, lumbar region         Orders Placed This Encounter   Procedures   • Ambulatory Referral to Physical Therapy Evaluate and treat; Soft Tissue Mobilizaton; ROM     Referral Priority:   Routine     Referral Type:   Therapy     Referral Reason:   Specialty Services Required     Requested Specialty:   Physical Therapy     Number of Visits Requested:   1       Procedures

## 2019-07-24 RX ORDER — GABAPENTIN 300 MG/1
CAPSULE ORAL
Qty: 60 CAPSULE | Refills: 0 | Status: SHIPPED | OUTPATIENT
Start: 2019-07-24 | End: 2020-08-03

## 2019-08-05 ENCOUNTER — HOSPITAL ENCOUNTER (OUTPATIENT)
Dept: MAMMOGRAPHY | Facility: HOSPITAL | Age: 64
Discharge: HOME | End: 2019-08-05
Admitting: INTERNAL MEDICINE

## 2019-08-05 DIAGNOSIS — G47.00 INSOMNIA, UNSPECIFIED TYPE: ICD-10-CM

## 2019-08-05 DIAGNOSIS — M25.511 RIGHT SHOULDER PAIN, UNSPECIFIED CHRONICITY: ICD-10-CM

## 2019-08-05 DIAGNOSIS — C50.411 MALIGNANT NEOPLASM OF UPPER-OUTER QUADRANT OF RIGHT FEMALE BREAST, UNSPECIFIED ESTROGEN RECEPTOR STATUS (HCC): ICD-10-CM

## 2019-08-05 DIAGNOSIS — C64.1 MALIGNANT NEOPLASM OF RIGHT KIDNEY, EXCEPT RENAL PELVIS (HCC): ICD-10-CM

## 2019-08-05 PROCEDURE — 77067 SCR MAMMO BI INCL CAD: CPT

## 2019-08-05 PROCEDURE — 77063 BREAST TOMOSYNTHESIS BI: CPT

## 2019-08-12 ENCOUNTER — APPOINTMENT (OUTPATIENT)
Dept: LAB | Facility: HOSPITAL | Age: 64
End: 2019-08-12

## 2019-08-12 ENCOUNTER — OFFICE VISIT (OUTPATIENT)
Dept: ONCOLOGY | Facility: CLINIC | Age: 64
End: 2019-08-12

## 2019-08-12 VITALS
DIASTOLIC BLOOD PRESSURE: 84 MMHG | WEIGHT: 246.6 LBS | HEIGHT: 64 IN | TEMPERATURE: 98.8 F | SYSTOLIC BLOOD PRESSURE: 147 MMHG | RESPIRATION RATE: 20 BRPM | HEART RATE: 72 BPM | BODY MASS INDEX: 42.1 KG/M2

## 2019-08-12 DIAGNOSIS — Z79.811 AROMATASE INHIBITOR USE: ICD-10-CM

## 2019-08-12 DIAGNOSIS — C64.1 CANCER OF KIDNEY, RIGHT (HCC): ICD-10-CM

## 2019-08-12 DIAGNOSIS — C50.411 MALIGNANT NEOPLASM OF UPPER-OUTER QUADRANT OF RIGHT FEMALE BREAST, UNSPECIFIED ESTROGEN RECEPTOR STATUS (HCC): Primary | ICD-10-CM

## 2019-08-12 LAB
ALBUMIN SERPL-MCNC: 3.7 G/DL (ref 3.5–4.8)
ALBUMIN/GLOB SERPL: 1.2 G/DL (ref 1–1.7)
ALP SERPL-CCNC: 76 U/L (ref 32–91)
ALT SERPL W P-5'-P-CCNC: 21 U/L (ref 14–54)
ANION GAP SERPL CALCULATED.3IONS-SCNC: 16.2 MMOL/L (ref 5–15)
AST SERPL-CCNC: 24 U/L (ref 15–41)
BASOPHILS # BLD AUTO: 0.07 10*3/MM3 (ref 0–0.2)
BASOPHILS NFR BLD AUTO: 0.9 % (ref 0–1.5)
BILIRUB SERPL-MCNC: 0.6 MG/DL (ref 0.3–1.2)
BUN BLD-MCNC: 16 MG/DL (ref 8–20)
BUN/CREAT SERPL: 17.8 (ref 5.4–26.2)
CALCIUM SPEC-SCNC: 9 MG/DL (ref 8.9–10.3)
CHLORIDE SERPL-SCNC: 105 MMOL/L (ref 101–111)
CO2 SERPL-SCNC: 22 MMOL/L (ref 22–32)
CREAT BLD-MCNC: 0.9 MG/DL (ref 0.4–1)
DEPRECATED RDW RBC AUTO: 49.6 FL (ref 37–54)
EOSINOPHIL # BLD AUTO: 0.12 10*3/MM3 (ref 0–0.4)
EOSINOPHIL NFR BLD AUTO: 1.6 % (ref 0.3–6.2)
ERYTHROCYTE [DISTWIDTH] IN BLOOD BY AUTOMATED COUNT: 15.5 % (ref 12.3–15.4)
GFR SERPL CREATININE-BSD FRML MDRD: 63 ML/MIN/1.73
GLOBULIN UR ELPH-MCNC: 3.1 GM/DL (ref 2.5–3.8)
GLUCOSE BLD-MCNC: 126 MG/DL (ref 65–99)
HCT VFR BLD AUTO: 42.9 % (ref 34–46.6)
HGB BLD-MCNC: 13.4 G/DL (ref 12–15.9)
LYMPHOCYTES # BLD AUTO: 1.4 10*3/MM3 (ref 0.7–3.1)
LYMPHOCYTES NFR BLD AUTO: 18.3 % (ref 19.6–45.3)
MCH RBC QN AUTO: 27.7 PG (ref 26.6–33)
MCHC RBC AUTO-ENTMCNC: 31.2 G/DL (ref 31.5–35.7)
MCV RBC AUTO: 88.8 FL (ref 79–97)
MONOCYTES # BLD AUTO: 0.47 10*3/MM3 (ref 0.1–0.9)
MONOCYTES NFR BLD AUTO: 6.2 % (ref 5–12)
NEUTROPHILS # BLD AUTO: 5.57 10*3/MM3 (ref 1.7–7)
NEUTROPHILS NFR BLD AUTO: 73 % (ref 42.7–76)
PLATELET # BLD AUTO: 303 10*3/MM3 (ref 140–450)
PMV BLD AUTO: 8.7 FL (ref 6–12)
POTASSIUM BLD-SCNC: 4.2 MMOL/L (ref 3.6–5.1)
PROT SERPL-MCNC: 6.8 G/DL (ref 6.1–7.9)
RBC # BLD AUTO: 4.83 10*6/MM3 (ref 3.77–5.28)
SODIUM BLD-SCNC: 139 MMOL/L (ref 136–144)
WBC NRBC COR # BLD: 7.63 10*3/MM3 (ref 3.4–10.8)

## 2019-08-12 PROCEDURE — 36415 COLL VENOUS BLD VENIPUNCTURE: CPT | Performed by: NURSE PRACTITIONER

## 2019-08-12 PROCEDURE — 82306 VITAMIN D 25 HYDROXY: CPT | Performed by: NURSE PRACTITIONER

## 2019-08-12 PROCEDURE — 85025 COMPLETE CBC W/AUTO DIFF WBC: CPT | Performed by: NURSE PRACTITIONER

## 2019-08-12 PROCEDURE — 80053 COMPREHEN METABOLIC PANEL: CPT | Performed by: NURSE PRACTITIONER

## 2019-08-12 PROCEDURE — 99213 OFFICE O/P EST LOW 20 MIN: CPT | Performed by: NURSE PRACTITIONER

## 2019-08-12 RX ORDER — MULTIVIT-MIN/FOLIC ACID/LUTEIN 500-250MCG
600 TABLET,CHEWABLE ORAL DAILY
Qty: 30 EACH | Refills: 6 | Status: CANCELLED | OUTPATIENT
Start: 2019-08-12

## 2019-08-12 NOTE — PROGRESS NOTES
Hematology/Oncology Outpatient Follow Up    PATIENT NAME:Celi Ordaz  :1955  MRN: [id number]  PRIMARY CARE PHYSICIAN: Yonathan Camargo Sr., MD  REFERRING PHYSICIAN: Yonathan Camargo Sr., MD    Chief Complaint   Patient presents with   • Follow-up     Stage IIA invasive lobular and LCIS of the right upper outer quadrant of the breast,Stage I clear cell renal cell cancer on the right, Left chest wall cyst         HISTORY OF PRESENT ILLNESS:   1.  Stage IIA (pT2,N0) invasive lobular cancer and lobular carcinoma in situ of the right upper outer quadrant breast diagnosed 2015.    • The patient felt a lump in her breast and went to her OB-GYN, Dr. Cavazos, and was sent for a mammogram.  The mammogram showed that the right breast was stable with asymmetric breast tissue in the upper outer right breast.  There was no definitive abnormality in the region of the area of concern.  An ultrasound was done which noted a palpable abnormality.  It was a spiculated shadow, 2.4 x 2.1 cm mass abutting the chest wall with angular margins at the 10 o’clock position suspicious for malignancy.  The remainder of the right breast was mammographically stable.  She was then referred to Dr. Dove.  On 7/29/15, she underwent a cholecystectomy, open, and a right breast mass excision.  Pathology showed invasive lobular carcinoma, tumor size 2.3 cm.  There was no DCIS present, but there was LCIS present.  Tumor focally extended to the margins.  Pathological staging was pT2pNX.  ER receptor positive, strong, 90% of cells.  OR receptor positive, strong, 70% of cells.  HER2/doris was negative, not amplified.  On 8/12/15, she underwent an MRI of bilateral breasts.  There was no abnormal enhancement seen in the left breast.  A single right axillary lymph node showed mild cortical thickening.  Post surgical changes were seen in the right breast.  It was felt that the thickening in the right axillary node was due to reactive hyperplasia.  No  other abnormality seen.  BRCA testing was done on 8/17/15.  There was no mutation detected in BRCA1 or BRCA2.  Shortly, thereafter, she sought a second opinion with Dr. Chun at the [facility] who reviewed the pathology slides and verified that there was tumor at the margin of the lumpectomy.  On 9/14/15, she underwent a right partial mastectomy by Dr. Chun.  There was a single microscopic focus of residual invasive lobular carcinoma, 1.1 mm in the right breast.  The margins were negative for invasive carcinoma.  Additional findings included focal lobular carcinoma in situ, atypical lobular hyperplasia, florid usual ductal hyperplasia without atypia and fibroadenomatoid changes.  Right axillary sentinel lymph node was negative for metastatic carcinoma in specimen #1 and #2.  Dr. Chun referred her to our office for management of her new diagnosis of invasive lobular carcinoma of the right breast.    • 10/5/15 - Patient seen by me in [facility] on consult request per Sarah Chun M.D. Comprehensive metabolic panel with significant abnormality of creatinine 1.2 (0.4-1) and CYP2D6 phenotype intermediate/extensive metabolizer (normal). Tamoxifen therapy was predicted to be effective. CA 27-29 of 24.2 (less than 38.6).   • 10/13/15 - PET scan with fluid collection in the right breast, changes of right nephrectomy, persistent superior mediastinal lymphadenopathy without significantly increased metabolic activity. Findings favor to reflect reactive changes. Abnormal hypermetabolic activity in the lingual tonsil lateralizing to the right, a common physiologic finding.   • 10/20/15 - WBC 6.1, hemoglobin 12.3, MCV 84.2, platelet count 320,000. After a long discussion with the patient regarding benefits of adjuvant therapy and risks and benefits of hormonal therapy or a combination of hormonal therapy and chemotherapy, she has elected to proceed with a  combination. Discussed the various types of treatment and order written for Taxotere 75 mg/M2 IV day 1, Cytoxan 600 mg/M2 IV day 1 to cycle 21 days for 4 cycles.   • 10/22/15 - Infusaport placement under fluoroscopic guidance by Sarah Chun M.D.   • 11/9/15 - The patient received cycle 1 Cytoxan and Taxotere.   • 11/10/15 - Omeprazole 40 mg p.o. q. d. prescribed for symptoms of gastritis.   • 11/16/15 - Comprehensive metabolic panel with creatinine 1.2 (0.4-1), AST 46 (15-41), ALT 71 (14-54).  Patient developed a red rash in the peroneal area requiring Diflucan and Nystatin treatment.   • 11/30/15 - Patient given cycle 2  chemotherapy.   • 12/1/15 - WBC 10.6, hemoglobin 11, MCV 86.1, platelet count 427,000.   • 12/28/15 - Cycle 3 chemotherapy given. Bilateral upper extremities venous Doppler studies normal. Done because of pain and swelling in the left upper extremity.   • 12/29/15 - Comprehensive metabolic panel with glucose 169 (65-99).    • 1/18/16 - Cycle 4 chemotherapy given and course completed.   • 1/20/16 - WBC 33.5, hemoglobin 11.5, MCV 87.1, platelet count 320,000. Aspirin 81 mg p.o. q. d. prescribed for port maintenance. Discussed options of treatment. Patient hesitant about radiation. Advised consultation with Radiation Oncology.   • 2/3/16 - Patient seen in consultation by Pako Butler M.D. with plans of treating her with adjuvant whole breast radiation to a total dose of 4050 cGy over a course of 15 fractions to the right breast followed by a boost of 810 cGy in 3 fractions to the lumpectomy cavity.   • 2/16/16 - DEXA scan normal.   • 2/23/16 - Patient started on radiation therapy on 2/23/16 and treatment completed on 2/29/16. Patient received accelerated partial breast radiation for a total dose of 3850 cGy in 10 fractions delivered twice daily.   • 3/17/16 - WBC 4.7, hemoglobin 12, platelet count 325,000. Discussed options of hormonal therapy. Patient started on Femara 2.5 mg p.o. q. d.  and Prolia 60 mg subq q. 6 months to prevent bone loss. Prolia denied by insurance company. [facility] referral made.  Aspirin 81 mg p.o. q. d. prescribed for port maintenance.   • 3/24/16 - Patient prescribed Acyclovir and Gabapentin for a rash on the right eyelid and right axillary area.   • 4/19/16 - Comprehensive metabolic panel with creatinine 1.1 (0.4-1.0).    • 5/12/16 - Ultrasound right axilla with area of palpable firmness within the axilla with normal ultrasound appearance and ultrasound of the breast with large postoperative seroma measuring up to 6.4 cm right breast at 12 to 1 o’clock 13 cm from the nipple.   • 5/17/16 - WBC 6.13, hemoglobin 12.4, platelet count 293,000. Patient complains of hot flashes but has not tried Black Cohosh yet. Asked to follow-up with Dr. Montoya for tenderness in the right breast and use Black Cohosh for the hot flashes.   • 5/24/16 - Patient seen in followup by Dr. Montoya. Recommended annual followup.   • 8/3/16 - WBC 6.43, hemoglobin 12, MCV 87.4, platelet count 283,000. Patient claims not to be taking Black Cohosh and hence still has some hot flashes.   • 8/10/16 - Mammogram with 6.8 cm seroma in the upper outer quadrant of the right breast, stable from 5/12/16.   • 10/25/16 - Patient underwent endometrial biopsy by Ceci Cavazos M.D. revealing scant strips of benign predominantly superficial endometrium with no hyperplasia or malignancy identified.   • 2/1/17 -WBC 6.98, hemoglobin 12.6, platelet count 255,000. Patient complains of leg pains for which she is taking Gabapentin through her primary care physician. Also complains of shoulder pain where she has had a nisha placed in the past. Is having difficulty sleeping at night. Has changed insurance and wants to try getting Prolia. Order written for right shoulder x-ray. Ambien 5 mg p.o. q.h.s. p.r.n. and Prolia 60 mg subq q. six months.   • 4/12/17 - Patient returned Hemoccult stool cards x3. One of three were  positive. Patient was referred to Dr. Thomas for colonoscopy. Patient did undergo colonoscopy on 5/2/17 per Dr. Thomas.   • 8/3/17 - WBC 4.9, hemoglobin 12.8, platelet count 246,000. Order written for yearly screening bilateral mammogram. Order also written for Prolia 60 mg subq every six months. The patient had been denied by her insurance earlier and has new insurance, so I reordered that today. The patient is to continue with the Femara 2.5 mg p.o. q. day.   • 8/8/17 - Prolia injections denied by patient’s insurance.   • 8/18/17 - Bilateral screening bernardo mammogram BI-RADS 2: Benign findings.   • 10/27/17 - Ultrasound limited right breast with postsurgical seroma noted in the 12 to 1 o’clock position in the posterior part of the breast.   • 12/19/17 - Patient underwent laparoscopic-assisted vaginal hysterectomy with bilateral salpingectomy by Ceci Cavazos M.D.   • 3/20/18 - Started Calcium Plus Vitamin D two tablets p.o. daily.   • 8/28/18 - Bilateral screening mammogram with tomography BI-RADS 2: Benign findings.   • 8/29/18 - Patient claims that her insurance does not cover DEXA scan and that she has had a heel scan done, the results of which she will bring in. Complaining of hot flashes. Advised Black Cohosh 40 mg p.o. b.i.d.   • 9/26/18 - Left Infusaport removal by Conor Ni M.D.   • 2/27/19 - Reviewed patient’s last osteoporosis screen done as a heel scan in May 2018 revealing a T-score of -0.4. Patient having occasional hot flashes that are tolerable and not taking Black Cohosh. Has noted a cystic structure on left lateral chest wall. Discussed removal. She wants to think about it.  • 8/5/2019-bilateral screening mammogram was negative for malignancy.  • 8/12/2019- patient stopped taking Femara in June 2019 due to muscle fatigue, achiness, fatigue fatigue which improved after discontinuing the drug.  Discussed option of starting Arimidex or tamoxifen, discussed side effects.  At this time she does not want  to take any endocrine suppressing medications.  2.  Stage I clear cell renal cell carcinoma (pT1b) of the right kidney diagnosed July 2015.  • In June 2015, the patient was having significant left knee pain and sought treatment per Dr. Lowery.  In preparation for arthroscopic surgery, a chest x-ray was done on 7/8/15 that showed increasing soft tissue fullness in the right paratracheal space on the PA view, recommending CT scan of the chest.  The lungs were clear and the heart size was borderline.  A chest CT was done on 7/14 that showed large heterogeneous enhancing right kidney mass concerning for renal cell carcinoma.  The size was up to 6.3 cm.  Two ground glass nodules were seen in the right lung, 4 mm in the right upper lobe and 2 mm nodule as well.  There was a 1.7 cm mildly heterogeneous nodule in the left lobe of the thyroid and there was peritracheal adenopathy, 2.0 x 1.3 cm and enlarged subcarinal lymph nodes, but there was no hilar lymphadenopathy.  On 7/29/15, Dr. Long removed her right kidney.  Pathology confirmed clear cell renal cell carcinoma.  Tumor size 6.5 x 6 cm at the upper pole of the kidney.  The tumor was limited to the kidney.  There was no lymphovascular invasion and margins were uninvolved by invasive carcinoma.  Pathologic stating was pT1B. CT abdomen and pelvis performed at [facility] on 7/16/15 revealed right renal mass consistent with renal cell carcinoma, hepatic hemangioma and cholelithiasis.   • 12/1/15 - CT abdomen and pelvis status post right nephrectomy with no signs of locally recurrent or metastatic renal cell carcinoma. Stable hepatic cysts and hemangiomas.   • 11/11/16 - CT abdomen and pelvis with stable hepatic cysts and hemangiomas. Revealed a previous cholecystectomy or stones within a completely contracted gallbladder.   • 11/7/17 - CT abdomen and pelvis at [facility] with stable hepatic hemangiomata.   • 12/19/17 - Cystoscopy performed during vaginal hysterectomy.  3 out of 4 quadrants with petechiae consistent with interstitial cystitis.   • 11/5/18 - CT abdomen and pelvis with postsurgical changes of right nephrectomy, absence of uterus, stable presumed hepatic hemangiomas, contracted gallbladder with multiple small stones, multilevel degenerative changes of the spine and no evidence of metastatic disease.   3.  Right lung nodules and mediastinal lymph adenopathy diagnosed July 2015.  • CT scan 7/14/15 showed peritracheal adenopathy as well as enlarged subcarinal lymph nodes.  There was no hilar lymphadenopathy.  In addition, there was a 1.7 cm mildly heterogeneous left thyroid lobe nodule.  There was associated nodules in the right lung.  It was felt that the lymphadenopathy could represent thymic reactivation or metastasis.    • 12/16/15 - CT chest with contrast revealed mediastinal lymphadenopathy, decreased in size since prior exam. Two vague nodule-like areas in the right lung similar to previous study. Fluid collection within the upper right breast. Hepatic lesions suggested on prior exam, possibly indicative of hemangiomas.   • 6/22/16 - CT scan chest with evidence for focal fluid and air within the soft tissues of the superior-anterior margin of the thyroid gland. Interval decrease in size of the mediastinal lymph nodes and interval decrease in size of postoperative seroma involving the soft tissues of the medial right breast.   • 11/11/16 - CT chest with postoperative change with fluid collection in the right breast. Possible minor area of radiation pneumonitis or fibrosis in the right upper lobe.   • 11/7/17 - CT chest with slight decrease in presumed postoperative collection of fat necrosis in the right breast.   • 12/7/17 - Chest x-ray with no active cardiopulmonary disease. Left-sided Infusaport and changes of right axillary node dissection along with changes of intramedullary rodding of the right humerus.   • 11/5/18 - CT chest at [facility] with no evidence  of metastatic disease in the chest.   Past Medical History:   Diagnosis Date   • Anemia    • Arthralgia of both lower legs     Impression: She was started on Gabapentin to treat her symptoms.   • Arthritis    • Cancer of kidney, right (CMS/HCC)     Impression: She is stable.   • Hernia, abdominal    • History of chicken pox    • Low back pain     Impression: Xray of her Lumbar spine was ordered and read by me. She was referred to Ortho for further evaluation and treatment.   • Malignant neoplasm of upper-outer quadrant of right female breast (CMS/HCC)     Right restricted extrimity   • Mild acid reflux     Impression: She was started on Omeprazole to treat her symptoms.   • Neck pain, acute     Impression: Xray of neck due to her complaints of pain. Xray was ordered and read by me. She was advised to use OTC NSAIDs to treat her symptoms. She was referred to Ortho for further evaluation and treatment.   • Overweight (BMI 25.0-29.9)    • Physical exam     Impression: Discussed injury prevention, diet and exercise, safe sexual practices, and screening for common diseases. Encouraged use of sunscreen and seatbelts. Discussed timing of cervical cancer screening. Encouraged monthly self-breast exams, yearly clinical breast exams, and discussed timing of mammograms. Avoidance of tobacco encouraged. Limitation or avoidance of alcohol encouraged. Recommen   • Right leg pain     Impression: U/S of right leg to r/o DVT.       Past Surgical History:   Procedure Laterality Date   • BREAST BIOPSY Right     core bx---right before 2015   • BREAST CYST EXCISION Bilateral    • BREAST LUMPECTOMY Right     before 2015   • CHOLECYSTECTOMY  07/2015   • HYSTERECTOMY     • KNEE LIGAMENT RECONSTRUCTION  05/2015   • LAPAROSCOPIC NEPHRECTOMY     • THYROIDECTOMY  2016   • TONSILLECTOMY AND ADENOIDECTOMY           Current Outpatient Medications:   •  gabapentin (NEURONTIN) 300 MG capsule, One nightly for a week then increase to every 12 hrs,  Disp: 60 capsule, Rfl: 0  •  Calcium Carb-Cholecalciferol (CALCIUM + D3) 600-200 MG-UNIT tablet, Take 1 tablet by mouth Daily., Disp: 30 tablet, Rfl: 3    No Known Allergies    Family History   Problem Relation Age of Onset   • Diabetes Father    • Diabetes Sister    • Cancer Brother         colon   • Cancer Maternal Grandfather         colon   • Heart disease Paternal Grandfather    • Breast cancer Maternal Aunt        Cancer-related family history includes Breast cancer in her maternal aunt; Cancer in her brother and maternal grandfather.    Social History     Tobacco Use   • Smoking status: Never Smoker   Substance Use Topics   • Alcohol use: No     Frequency: Never   • Drug use: No       I have reviewed the history of present illness, past medical history, family history, social history, lab results, all notes and other records since the patient was last seen on Visit 2/27/19.    SUBJECTIVE: Patient is here for follow-up of her right breast cancer and right renal cell carcinoma.  Patient states that she has not been taking Femara since June and she is feeling better.  She had joint aches, urgent bowel movements, fatigue and hot flashes.  She state that she has 3 bowel movements daily. She states that she has not started physical therapy yet for her chronic low back pain due to a problem with her insurance. She has back pain that comes and goes due to DJD that has been going on for a long time. She is not currently taking Ca+ Vitamin D due to the medicine causing constipation.     Saima Bridges CMA ([facility]) was present during office visit.           REVIEW OF SYSTEMS:  Review of Systems   Constitutional: Negative for diaphoresis, fatigue, fever and unexpected weight change.   HENT: Negative for congestion and nosebleeds.    Eyes: Negative.    Respiratory: Negative for cough and shortness of breath.    Cardiovascular: Negative for chest pain and leg swelling.   Gastrointestinal: Negative for abdominal pain, blood in  "stool, constipation, diarrhea, nausea and vomiting.   Endocrine: Negative for cold intolerance and heat intolerance.   Genitourinary: Negative for dysuria and hematuria.   Musculoskeletal: Positive for back pain. Negative for arthralgias and joint swelling.   Skin: Negative for rash and wound.   Neurological: Negative for numbness and headaches.   Hematological: Does not bruise/bleed easily.   Psychiatric/Behavioral: Negative for confusion. The patient is not nervous/anxious.    All other systems reviewed and are negative.      OBJECTIVE:    Vitals:    08/12/19 1003   BP: 147/84   Pulse: 72   Resp: 20   Temp: 98.8 °F (37.1 °C)   Weight: 112 kg (246 lb 9.6 oz)   Height: 162.6 cm (64\")   PainSc: 0-No pain       ECOG  (0) Fully active, able to carry on all predisease performance without restriction    Physical Exam   Constitutional: She is oriented to person, place, and time. She appears well-developed and well-nourished.   Alone.  Morbid obesity.   HENT:   Head: Normocephalic and atraumatic.   Mouth/Throat: Oropharynx is clear and moist.   Eyes: Conjunctivae, EOM and lids are normal. Pupils are equal, round, and reactive to light.   Neck: Normal range of motion. Neck supple. No thyromegaly present.   Cardiovascular: Normal rate, regular rhythm and normal heart sounds.   No murmur heard.  Pulmonary/Chest: Effort normal and breath sounds normal. No respiratory distress.   Abdominal: Soft. Normal appearance and bowel sounds are normal. She exhibits no distension.   Genitourinary:   Genitourinary Comments: Deferred.   Musculoskeletal: Normal range of motion. She exhibits no edema.   Lymphadenopathy:     She has no cervical adenopathy.     She has no axillary adenopathy.        Right: No supraclavicular adenopathy present.        Left: No supraclavicular adenopathy present.   Neurological: She is alert and oriented to person, place, and time.   Skin: Skin is warm and dry. Capillary refill takes less than 2 seconds. No " bruising, no petechiae and no rash noted.   Suntanned   Psychiatric: Her speech is normal and behavior is normal. Judgment and thought content normal. Cognition and memory are normal.   Flat affect   Nursing note and vitals reviewed.      RECENT LABS  WBC   Date Value Ref Range Status   08/12/2019 7.63 3.40 - 10.80 10*3/mm3 Final     RBC   Date Value Ref Range Status   08/12/2019 4.83 3.77 - 5.28 10*6/mm3 Final   10/27/2016 4.69 3.80 - 5.10 Million/uL Final     Hemoglobin   Date Value Ref Range Status   08/12/2019 13.4 12.0 - 15.9 g/dL Final     Hematocrit   Date Value Ref Range Status   08/12/2019 42.9 34.0 - 46.6 % Final     MCV   Date Value Ref Range Status   08/12/2019 88.8 79.0 - 97.0 fL Final     MCH   Date Value Ref Range Status   08/12/2019 27.7 26.6 - 33.0 pg Final     MCHC   Date Value Ref Range Status   08/12/2019 31.2 (L) 31.5 - 35.7 g/dL Final     RDW   Date Value Ref Range Status   08/12/2019 15.5 (H) 12.3 - 15.4 % Final     RDW-SD   Date Value Ref Range Status   08/12/2019 49.6 37.0 - 54.0 fl Final     MPV   Date Value Ref Range Status   08/12/2019 8.7 6.0 - 12.0 fL Final     Platelets   Date Value Ref Range Status   08/12/2019 303 140 - 450 10*3/mm3 Final     Neutrophil %   Date Value Ref Range Status   08/12/2019 73.0 42.7 - 76.0 % Final     Lymphocyte %   Date Value Ref Range Status   08/12/2019 18.3 (L) 19.6 - 45.3 % Final     Monocyte %   Date Value Ref Range Status   08/12/2019 6.2 5.0 - 12.0 % Final     Eosinophil %   Date Value Ref Range Status   08/12/2019 1.6 0.3 - 6.2 % Final     Basophil %   Date Value Ref Range Status   08/12/2019 0.9 0.0 - 1.5 % Final     Neutrophils, Absolute   Date Value Ref Range Status   08/12/2019 5.57 1.70 - 7.00 10*3/mm3 Final     Lymphocytes, Absolute   Date Value Ref Range Status   08/12/2019 1.40 0.70 - 3.10 10*3/mm3 Final     Monocytes, Absolute   Date Value Ref Range Status   08/12/2019 0.47 0.10 - 0.90 10*3/mm3 Final     Eosinophils, Absolute   Date Value  Ref Range Status   08/12/2019 0.12 0.00 - 0.40 10*3/mm3 Final     Basophils, Absolute   Date Value Ref Range Status   08/12/2019 0.07 0.00 - 0.20 10*3/mm3 Final     nRBC   Date Value Ref Range Status   09/18/2018 0 0 /100[WBCs] Final       Lab Results   Component Value Date    GLUCOSE 180 (H) 09/18/2018    BUN 16 09/18/2018    CREATININE 1.1 (H) 09/18/2018    EGFRIFNONA 53 (L) 10/27/2016    EGFRIFAFRI 62 10/27/2016    BCR 14.5 09/18/2018    K 3.8 09/18/2018    CO2 22 09/18/2018    CALCIUM 9.1 09/18/2018    ALBUMIN 3.7 12/07/2017    LABIL2 1.2 12/07/2017    AST 24 12/07/2017    ALT 23 12/07/2017             ASSESSMENT:    1.   Stage IIA invasive lobular and LCIS of the right upper outer quadrant of the breast: Mammogram negative for malignancy.  She stopped taking Femara in June 2019 and does not want to proceed with any further endocrine suppression.  Discussed side effects of tamoxifen and Arimidex.  Explained that endocrine suppression decreases her risk of recurrent disease by 50%.  Offered her to return in 3 months as opposed to 6 months, for further discussion with Dr. Reddy.  Encouraged her to start taking calcium plus vitamin D 1 tablet daily.  If she were to try Arimidex, Prolia may be an option of treatment as she has new insurance since Prolia was denied in 2016.  Ordered CMP and vitamin D level.  2.   Stage I clear cell renal cell cancer on the right: We will follow  3.    Left chest wall cyst: We will follow.  4.   Chronic low back pain/degenerative disc disease:  followed by Dr. Galaviz.  She is on gabapentin with plan to start physical   therapy soon.    PLAN:     1. CMP and vitamin D levels.    2. Start calcium plus vitamin D 1 tab daily.       I have reviewed labs results, imaging, vitals, and medications with the patient today and have reviewed information entered by Saima Bridges CMA ([facility]).     Will follow up in six months with the physician and a CBC.     Patient verbalized understanding and is in  agreement of the above plan.    Electronically signed by MARCELO Tse, 08/12/19, 10:59 AM.      Much of the above report is an electronic transcription/translation of the spoken language to printed text using Dragon Software. As such, the subtleties and finesse of the spoken language may permit erroneous, or at times, nonsensical words or phrases to be inadvertently transcribed; thus changes may be made at a later date to rectify these errors.

## 2019-08-13 LAB — 25(OH)D3 SERPL-MCNC: 28.5 NG/ML (ref 30–100)

## 2019-10-08 ENCOUNTER — TRANSCRIBE ORDERS (OUTPATIENT)
Dept: LAB | Facility: HOSPITAL | Age: 64
End: 2019-10-08

## 2019-10-08 ENCOUNTER — LAB (OUTPATIENT)
Dept: LAB | Facility: HOSPITAL | Age: 64
End: 2019-10-08

## 2019-10-08 DIAGNOSIS — R19.7 DIARRHEA, UNSPECIFIED TYPE: ICD-10-CM

## 2019-10-08 DIAGNOSIS — R19.7 DIARRHEA, UNSPECIFIED TYPE: Primary | ICD-10-CM

## 2019-10-08 LAB
ADV 40+41 DNA STL QL NAA+NON-PROBE: NOT DETECTED
ASTRO TYP 1-8 RNA STL QL NAA+NON-PROBE: NOT DETECTED
C CAYETANENSIS DNA STL QL NAA+NON-PROBE: NOT DETECTED
CAMPY SP DNA.DIARRHEA STL QL NAA+PROBE: NOT DETECTED
CRYPTOSP STL CULT: NOT DETECTED
E COLI DNA SPEC QL NAA+PROBE: NOT DETECTED
E HISTOLYT AG STL-ACNC: NOT DETECTED
EAEC PAA PLAS AGGR+AATA ST NAA+NON-PRB: NOT DETECTED
EC STX1 + STX2 GENES STL NAA+PROBE: NOT DETECTED
EPEC EAE GENE STL QL NAA+NON-PROBE: NOT DETECTED
ETEC LTA+ST1A+ST1B TOX ST NAA+NON-PROBE: NOT DETECTED
G LAMBLIA DNA SPEC QL NAA+PROBE: NOT DETECTED
NOROVIRUS GI+II RNA STL QL NAA+NON-PROBE: NOT DETECTED
P SHIGELLOIDES DNA STL QL NAA+PROBE: NOT DETECTED
RV RNA STL NAA+PROBE: NOT DETECTED
SALMONELLA DNA SPEC QL NAA+PROBE: NOT DETECTED
SAPO I+II+IV+V RNA STL QL NAA+NON-PROBE: NOT DETECTED
SHIGELLA SP+EIEC IPAH STL QL NAA+PROBE: NOT DETECTED
V CHOLERAE DNA SPEC QL NAA+PROBE: NOT DETECTED
VIBRIO DNA SPEC NAA+PROBE: NOT DETECTED
YERSINIA STL CULT: NOT DETECTED

## 2019-10-08 PROCEDURE — 0097U HC BIOFIRE FILMARRAY GI PANEL: CPT

## 2019-10-08 PROCEDURE — 87324 CLOSTRIDIUM AG IA: CPT

## 2019-10-08 PROCEDURE — 87449 NOS EACH ORGANISM AG IA: CPT

## 2019-10-09 LAB — C DIFF GDH STL QL: NEGATIVE

## 2019-11-18 ENCOUNTER — TRANSCRIBE ORDERS (OUTPATIENT)
Dept: ADMINISTRATIVE | Facility: HOSPITAL | Age: 64
End: 2019-11-18

## 2019-11-18 DIAGNOSIS — M54.50 LOW BACK PAIN, UNSPECIFIED BACK PAIN LATERALITY, UNSPECIFIED CHRONICITY, UNSPECIFIED WHETHER SCIATICA PRESENT: Primary | ICD-10-CM

## 2019-11-18 DIAGNOSIS — Z85.528 PERSONAL HISTORY OF KIDNEY CANCER: ICD-10-CM

## 2019-11-19 ENCOUNTER — HOSPITAL ENCOUNTER (OUTPATIENT)
Dept: CT IMAGING | Facility: HOSPITAL | Age: 64
Discharge: HOME | End: 2019-11-19
Admitting: UROLOGY

## 2019-11-19 ENCOUNTER — APPOINTMENT (OUTPATIENT)
Dept: CT IMAGING | Facility: HOSPITAL | Age: 64
End: 2019-11-19

## 2019-11-19 DIAGNOSIS — M54.50 LOW BACK PAIN, UNSPECIFIED BACK PAIN LATERALITY, UNSPECIFIED CHRONICITY, UNSPECIFIED WHETHER SCIATICA PRESENT: ICD-10-CM

## 2019-11-19 DIAGNOSIS — Z85.528 PERSONAL HISTORY OF KIDNEY CANCER: ICD-10-CM

## 2019-11-19 LAB — CREAT BLDA-MCNC: 1 MG/DL (ref 0.6–1.3)

## 2019-11-19 PROCEDURE — 74178 CT ABD&PLV WO CNTR FLWD CNTR: CPT

## 2019-11-19 PROCEDURE — 71260 CT THORAX DX C+: CPT

## 2019-11-19 PROCEDURE — 82565 ASSAY OF CREATININE: CPT

## 2019-11-19 PROCEDURE — 0 IOPAMIDOL PER 1 ML: Performed by: UROLOGY

## 2019-11-19 RX ADMIN — IOPAMIDOL 100 ML: 755 INJECTION, SOLUTION INTRAVENOUS at 17:30

## 2019-12-05 ENCOUNTER — OFFICE VISIT (OUTPATIENT)
Dept: FAMILY MEDICINE CLINIC | Facility: CLINIC | Age: 64
End: 2019-12-05

## 2019-12-05 ENCOUNTER — HOSPITAL ENCOUNTER (OUTPATIENT)
Dept: GENERAL RADIOLOGY | Facility: HOSPITAL | Age: 64
Discharge: HOME | End: 2019-12-05
Admitting: FAMILY MEDICINE

## 2019-12-05 VITALS
TEMPERATURE: 98 F | WEIGHT: 248 LBS | HEART RATE: 65 BPM | DIASTOLIC BLOOD PRESSURE: 83 MMHG | OXYGEN SATURATION: 100 % | SYSTOLIC BLOOD PRESSURE: 137 MMHG | RESPIRATION RATE: 18 BRPM | BODY MASS INDEX: 42.34 KG/M2 | HEIGHT: 64 IN

## 2019-12-05 DIAGNOSIS — Z23 FLU VACCINE NEED: ICD-10-CM

## 2019-12-05 DIAGNOSIS — G89.29 CHRONIC PAIN OF RIGHT KNEE: Primary | ICD-10-CM

## 2019-12-05 DIAGNOSIS — M25.562 ARTHRALGIA OF BOTH LOWER LEGS: ICD-10-CM

## 2019-12-05 DIAGNOSIS — M25.561 CHRONIC PAIN OF RIGHT KNEE: Primary | ICD-10-CM

## 2019-12-05 DIAGNOSIS — H93.13 TINNITUS OF BOTH EARS: ICD-10-CM

## 2019-12-05 DIAGNOSIS — M25.561 ARTHRALGIA OF BOTH LOWER LEGS: ICD-10-CM

## 2019-12-05 PROCEDURE — 90471 IMMUNIZATION ADMIN: CPT | Performed by: FAMILY MEDICINE

## 2019-12-05 PROCEDURE — 90674 CCIIV4 VAC NO PRSV 0.5 ML IM: CPT | Performed by: FAMILY MEDICINE

## 2019-12-05 PROCEDURE — 99214 OFFICE O/P EST MOD 30 MIN: CPT | Performed by: FAMILY MEDICINE

## 2019-12-05 PROCEDURE — 73564 X-RAY EXAM KNEE 4 OR MORE: CPT

## 2019-12-05 PROCEDURE — 20610 DRAIN/INJ JOINT/BURSA W/O US: CPT | Performed by: FAMILY MEDICINE

## 2019-12-05 RX ORDER — METHYLPREDNISOLONE ACETATE 80 MG/ML
80 INJECTION, SUSPENSION INTRA-ARTICULAR; INTRALESIONAL; INTRAMUSCULAR; SOFT TISSUE ONCE
Status: COMPLETED | OUTPATIENT
Start: 2019-12-05 | End: 2019-12-05

## 2019-12-05 RX ADMIN — METHYLPREDNISOLONE ACETATE 80 MG: 80 INJECTION, SUSPENSION INTRA-ARTICULAR; INTRALESIONAL; INTRAMUSCULAR; SOFT TISSUE at 13:07

## 2019-12-05 NOTE — PROGRESS NOTES
Chief Complaint   Patient presents with   • Knee Pain     right knee- would like injection   • Leg Pain     would like Doppler (next year)    • Tinnitus     Would like referal to [facility]       History of Present Illness:  Subjective   Celi Ordaz is a 64 y.o. female.   History of Present Illness     Knee Pain RIGHT:   Patient has right knee pain which is chronic. She has previously had a joint injection in this knee (in June) which did help decrease her pain by about 50% up until about a month ago. She reports this pain is in the lateral and posterior aspect of the knee (she reports she felt something pop prior to getting her last injection). She also feels she may have a Bakers Cyst in the back of the knee as well but isn't positive. She hs decreased range of motion, pain that causes inability to sleep.   She would like to have another joint injection done in her right knee today.     Knee Pain LEFT:   Patient also reports chronic pain in the left knee which she previously had a meniscus repair surgery. She reports pain of the interior left knee and was advised by Dr. Lowery that is bone on bone and she would eventually be a candidate for knee replacement. She would like to discuss have a joint injection in the left knee today.     Leg Pain:   Patient is concerned about PVD in the bilateral legs. She has spider veins and swelling to the lower extremities. She also feels lumps in her thighs and she had never noticed them before. She states they aren't extremely tender, but they are tender. She reports that if she has varicose veins then they are deep.     Tinnitus:   Patient reports that she still has tinnitus that she is dealing with. She report this is in both ears. Right being worse than left. She reports this is constant. She has tried tinnitus exercises but these did not work for her. She would like to be referred to an ENT doctor at [facility].     Referrals: She can wait until next year for  these.       Allergies:  No Known Allergies    Social History:  Social History     Socioeconomic History   • Marital status:      Spouse name: Not on file   • Number of children: Not on file   • Years of education: Not on file   • Highest education level: Not on file   Tobacco Use   • Smoking status: Never Smoker   Substance and Sexual Activity   • Alcohol use: No     Frequency: Never   • Drug use: No       Family History:  Family History   Problem Relation Age of Onset   • Diabetes Father    • Diabetes Sister    • Cancer Brother         colon   • Cancer Maternal Grandfather         colon   • Heart disease Paternal Grandfather    • Breast cancer Maternal Aunt        Past Medical History :  Active Ambulatory Problems     Diagnosis Date Noted   • Right leg pain 06/21/2019   • Low back pain 06/21/2019   • Tinnitus of both ears 06/21/2019   • Anemia 06/21/2019   • Arthralgia of both lower legs 06/21/2019   • Arthritis 06/21/2019   • Conjunctivitis 04/15/2017   • Breast cancer (CMS/[facility]) 09/18/2018   • Cancer of kidney, right (CMS/[facility]) 06/21/2019   • Cervical spondylosis without myelopathy 08/15/2018   • Malignant neoplasm of upper-outer quadrant of right female breast (CMS/[facility]) 06/21/2019   • Neck pain, acute 06/21/2019   • Osteoarthritis of lumbosacral spine with radiculopathy 08/15/2018   • Other spondylosis with radiculopathy, lumbar region 07/17/2019   • Chronic pain of right knee 12/29/2019     Resolved Ambulatory Problems     Diagnosis Date Noted   • Need for immunization against influenza 06/21/2019     Past Medical History:   Diagnosis Date   • Hernia, abdominal    • History of chicken pox    • Mild acid reflux    • Overweight (BMI 25.0-29.9)    • Physical exam        Medication List:    Current Outpatient Medications:   •  Calcium Carb-Cholecalciferol (CALCIUM + D3) 600-200 MG-UNIT tablet, Take 1 tablet by mouth 2 (Two) Times a Day., Disp: 60 tablet, Rfl: 3  •  diclofenac (VOLTAREN) 1 % gel gel, Dose is 4  grams for knees, ankles, feet.  Dose is 2 grams for elbows, wrists, hands. Apply 4 times a day as needed., Disp: 1 tube, Rfl: 12  •  gabapentin (NEURONTIN) 300 MG capsule, One nightly for a week then increase to every 12 hrs, Disp: 60 capsule, Rfl: 0    Past Surgical History:  Past Surgical History:   Procedure Laterality Date   • BREAST BIOPSY Right     core bx---right before 2015   • BREAST CYST EXCISION Bilateral    • BREAST LUMPECTOMY Right     before 2015   • CHOLECYSTECTOMY  07/2015   • HYSTERECTOMY     • KNEE LIGAMENT RECONSTRUCTION  05/2015   • LAPAROSCOPIC NEPHRECTOMY     • THYROIDECTOMY  2016   • TONSILLECTOMY AND ADENOIDECTOMY          The following portions of the patient's history were reviewed and updated as appropriate: allergies, current medications, past family history, past medical history, past social history, past surgical history and problem list.    Review Of Systems:  Review of Systems   Constitutional: Negative for activity change, appetite change and fatigue.   HENT: Positive for tinnitus. Negative for congestion, postnasal drip, sinus pressure and sore throat.    Eyes: Negative for blurred vision and itching.   Respiratory: Negative for cough, shortness of breath and wheezing.    Cardiovascular: Negative for chest pain.   Gastrointestinal: Negative for abdominal pain, constipation, nausea, vomiting and GERD.   Endocrine: Negative for cold intolerance and heat intolerance.   Genitourinary: Negative for difficulty urinating, dysuria and urinary incontinence.   Musculoskeletal: Positive for arthralgias. Negative for back pain, joint swelling and neck pain.        Right knee pain.   Skin: Negative for color change and rash.   Neurological: Negative for dizziness, speech difficulty, weakness and memory problem.   Psychiatric/Behavioral: Negative for behavioral problems, decreased concentration, suicidal ideas and depressed mood.       Physical Exam:  Vital Signs:  Vitals:    12/05/19 1229   BP:  "137/83   Pulse: 65   Resp: 18   Temp: 98 °F (36.7 °C)   SpO2: 100%   Weight: 112 kg (248 lb)   Height: 162.6 cm (64\")     Body mass index is 42.57 kg/m².    Physical Exam   Constitutional: She is oriented to person, place, and time. She appears well-developed and well-nourished. She is active.   HENT:   Head: Normocephalic and atraumatic.   Nose: Nose normal.   Eyes: Pupils are equal, round, and reactive to light. Conjunctivae and lids are normal.   Neck: Normal range of motion. Neck supple.   Cardiovascular: Normal rate, regular rhythm, normal heart sounds and normal pulses.   Pulmonary/Chest: Effort normal and breath sounds normal. No respiratory distress.   Musculoskeletal:        Right knee: She exhibits decreased range of motion and swelling. Tenderness found. Medial joint line tenderness noted.   Neurological: She is alert and oriented to person, place, and time.   Skin: Skin is warm and dry. Capillary refill takes less than 2 seconds.   Psychiatric: She has a normal mood and affect. Her behavior is normal. Judgment and thought content normal.   Vitals reviewed.        Assessment and Plan:  Diagnoses and all orders for this visit:    1. Chronic pain of right knee (Primary)  Assessment & Plan:  Xray ordered on her knee due to her pain.  She was given a joint injection.    Orders:  -     diclofenac (VOLTAREN) 1 % gel gel; Dose is 4 grams for knees, ankles, feet.  Dose is 2 grams for elbows, wrists, hands. Apply 4 times a day as needed.  Dispense: 1 tube; Refill: 12  -     methylPREDNISolone acetate (DEPO-medrol) injection 80 mg  -     XR Knee 4+ View Right    2. Tinnitus of both ears  Assessment & Plan:  Her symptoms are not improved.   She was referred to ENT for further evaluation and treatment.     Orders:  -     Ambulatory Referral to ENT (Otolaryngology)    3. Arthralgia of both lower legs  Assessment & Plan:  Her symptoms are stable.      4. Flu vaccine need  -     Flucelvax Quad=>4Years " ([id number])

## 2019-12-11 ENCOUNTER — OFFICE VISIT (OUTPATIENT)
Dept: FAMILY MEDICINE CLINIC | Facility: CLINIC | Age: 64
End: 2019-12-11

## 2019-12-11 VITALS
OXYGEN SATURATION: 99 % | RESPIRATION RATE: 18 BRPM | WEIGHT: 248 LBS | HEART RATE: 70 BPM | TEMPERATURE: 98 F | SYSTOLIC BLOOD PRESSURE: 133 MMHG | HEIGHT: 64 IN | DIASTOLIC BLOOD PRESSURE: 85 MMHG | BODY MASS INDEX: 42.34 KG/M2

## 2019-12-11 DIAGNOSIS — G89.29 CHRONIC PAIN OF LEFT KNEE: ICD-10-CM

## 2019-12-11 DIAGNOSIS — M25.562 CHRONIC PAIN OF LEFT KNEE: ICD-10-CM

## 2019-12-11 DIAGNOSIS — I83.12 VARICOSE VEINS OF BOTH LOWER EXTREMITIES WITH INFLAMMATION: Primary | ICD-10-CM

## 2019-12-11 DIAGNOSIS — I83.11 VARICOSE VEINS OF BOTH LOWER EXTREMITIES WITH INFLAMMATION: Primary | ICD-10-CM

## 2019-12-11 PROCEDURE — 99213 OFFICE O/P EST LOW 20 MIN: CPT | Performed by: FAMILY MEDICINE

## 2019-12-11 PROCEDURE — 20610 DRAIN/INJ JOINT/BURSA W/O US: CPT | Performed by: FAMILY MEDICINE

## 2019-12-11 NOTE — PROGRESS NOTES
Procedure   Procedures    Knee  with Injection Procedure Note    Pre-operative Diagnosis: left knee pain    Post-operative Diagnosis: same    Indications: Symptomatic relief of large effusion    Anesthesia: Lidocaine 1% without epinephrine with 0.5 % Marcaine and 80mg DepoMedrol    Procedure Details     Verbal consent was obtained for the procedure. The joint was prepped with Betadine and a small wheel of anesthetic was injected into the subcutaneous tissue. A 22 gauge needle was inserted into the superior aspect of the joint from a lateral approach. 1 ml 1% lidocaine, 1 ml of 0.5% Marcaine and 1 ml of Depo-Medrol 80mg/ml  was then injected into the joint through the same needle. The needle was removed and the area cleansed and dressed.    Complications:  None; patient tolerated the procedure well.

## 2019-12-11 NOTE — PROGRESS NOTES
Chief Complaint   Patient presents with   • Knee Pain     left knee pain. Here today for injection.        History of Present Illness:  Subjective   Celi Ordaz is a 64 y.o. female.   History of Present Illness   Knee Pain Left Knee:   Patient is here today to have an injection on the left knee. She recently had an injection on the right knee and reports that it has improved by 40%. She reports today that her left knee pain is around a 5/10. She states she has been told she would likely need a total knee replacement sometime in the future. This was told to her by her ortho surgeon when he did a surgical repair to this knee.   Varicose Veins:   Patient complains of varicose veins. Symptoms include the veins are painful -- severity: moderate. Symptoms have been ongoing for about a few years. Symptoms have gradually worsened. Patient has not been evaluated for this previously.  Patient denies history of deep vein thrombosis or superficial thrombophlebitis. The patient denies history of trauma to the lower extremities.  Evaluation to date has included: a previous visit in this clinic. Treatment to date has included: NSAIDs.    Allergies:  No Known Allergies    Social History:  Social History     Socioeconomic History   • Marital status:      Spouse name: Not on file   • Number of children: Not on file   • Years of education: Not on file   • Highest education level: Not on file   Tobacco Use   • Smoking status: Never Smoker   Substance and Sexual Activity   • Alcohol use: No     Frequency: Never   • Drug use: No       Family History:  Family History   Problem Relation Age of Onset   • Diabetes Father    • Diabetes Sister    • Cancer Brother         colon   • Cancer Maternal Grandfather         colon   • Heart disease Paternal Grandfather    • Breast cancer Maternal Aunt        Past Medical History :  Active Ambulatory Problems     Diagnosis Date Noted   • Right leg pain 06/21/2019   • Low back pain 06/21/2019    • Tinnitus of both ears 06/21/2019   • Anemia 06/21/2019   • Arthralgia of both lower legs 06/21/2019   • Arthritis 06/21/2019   • Conjunctivitis 04/15/2017   • Breast cancer (CMS/[facility]) 09/18/2018   • Cancer of kidney, right (CMS/[facility]) 06/21/2019   • Cervical spondylosis without myelopathy 08/15/2018   • Malignant neoplasm of upper-outer quadrant of right female breast (CMS/[facility]) 06/21/2019   • Neck pain, acute 06/21/2019   • Osteoarthritis of lumbosacral spine with radiculopathy 08/15/2018   • Other spondylosis with radiculopathy, lumbar region 07/17/2019   • Chronic pain of right knee 12/29/2019   • Varicose veins of both lower extremities with inflammation 12/31/2019   • Chronic pain of left knee 12/31/2019     Resolved Ambulatory Problems     Diagnosis Date Noted   • Need for immunization against influenza 06/21/2019     Past Medical History:   Diagnosis Date   • Hernia, abdominal    • History of chicken pox    • Mild acid reflux    • Overweight (BMI 25.0-29.9)    • Physical exam        Medication List:    Current Outpatient Medications:   •  Calcium Carb-Cholecalciferol (CALCIUM + D3) 600-200 MG-UNIT tablet, Take 1 tablet by mouth 2 (Two) Times a Day., Disp: 60 tablet, Rfl: 3  •  diclofenac (VOLTAREN) 1 % gel gel, Dose is 4 grams for knees, ankles, feet.  Dose is 2 grams for elbows, wrists, hands. Apply 4 times a day as needed., Disp: 1 tube, Rfl: 12  •  gabapentin (NEURONTIN) 300 MG capsule, One nightly for a week then increase to every 12 hrs, Disp: 60 capsule, Rfl: 0    Past Surgical History:  Past Surgical History:   Procedure Laterality Date   • BREAST BIOPSY Right     core bx---right before 2015   • BREAST CYST EXCISION Bilateral    • BREAST LUMPECTOMY Right     before 2015   • CHOLECYSTECTOMY  07/2015   • HYSTERECTOMY     • KNEE LIGAMENT RECONSTRUCTION  05/2015   • LAPAROSCOPIC NEPHRECTOMY     • THYROIDECTOMY  2016   • TONSILLECTOMY AND ADENOIDECTOMY          The following portions of the patient's  "history were reviewed and updated as appropriate: allergies, current medications, past family history, past medical history, past social history, past surgical history and problem list.    Review Of Systems:  Review of Systems   Constitutional: Negative for activity change, appetite change and fatigue.   HENT: Negative for congestion, postnasal drip, sinus pressure and sore throat.    Eyes: Negative for blurred vision and itching.   Respiratory: Negative for cough, shortness of breath and wheezing.    Cardiovascular: Negative for chest pain.   Gastrointestinal: Negative for abdominal pain, constipation, nausea, vomiting and GERD.   Endocrine: Negative for cold intolerance and heat intolerance.   Genitourinary: Negative for difficulty urinating, dysuria and urinary incontinence.   Musculoskeletal: Negative for back pain, joint swelling and neck pain.        Left knee pain.    Skin: Negative for color change and rash.        Varicose veins that are tender to palpation.   Neurological: Negative for dizziness, speech difficulty, weakness and memory problem.   Psychiatric/Behavioral: Negative for behavioral problems, decreased concentration, suicidal ideas and depressed mood.       Physical Exam:  Vital Signs:  Vitals:    12/11/19 1015   BP: 133/85   Pulse: 70   Resp: 18   Temp: 98 °F (36.7 °C)   SpO2: 99%   Weight: 112 kg (248 lb)   Height: 162.6 cm (64\")     Body mass index is 42.57 kg/m².    Physical Exam   Constitutional: She is oriented to person, place, and time. She appears well-developed and well-nourished. She is active.   HENT:   Head: Normocephalic and atraumatic.   Nose: Nose normal.   Eyes: Pupils are equal, round, and reactive to light. Conjunctivae and lids are normal.   Neck: Normal range of motion. Neck supple.   Cardiovascular: Normal rate, regular rhythm, normal heart sounds and normal pulses.   Pulmonary/Chest: Effort normal and breath sounds normal. No respiratory distress.   Abdominal: Soft. Bowel " sounds are normal.   Musculoskeletal:        Left knee: She exhibits decreased range of motion and swelling. Tenderness found.   Neurological: She is alert and oriented to person, place, and time.   Skin: Skin is warm and dry. Capillary refill takes less than 2 seconds.   Psychiatric: She has a normal mood and affect. Her behavior is normal. Judgment and thought content normal.   Vitals reviewed.        Assessment and Plan:  Diagnoses and all orders for this visit:    1. Varicose veins of both lower extremities with inflammation (Primary)  Assessment & Plan:  Due to her Varicose veins and the pain associated with them she was referred to a vascular surgeon for further evaluation and treatment.     Orders:  -     Ambulatory Referral to Vascular Surgery    2. Chronic pain of left knee  Assessment & Plan:  She was given a joint injection in the left knee during this visit.

## 2019-12-19 ENCOUNTER — TELEPHONE (OUTPATIENT)
Dept: ONCOLOGY | Facility: CLINIC | Age: 64
End: 2019-12-19

## 2019-12-19 NOTE — TELEPHONE ENCOUNTER
Ms. Ordaz left message to contact her.  Called pt.  She had received reminder call w/ MD baum.  She prefers to stay w/ Dr. Reddy, so Feb appt canceled.

## 2019-12-29 PROBLEM — G89.29 CHRONIC PAIN OF RIGHT KNEE: Status: ACTIVE | Noted: 2019-12-29

## 2019-12-29 PROBLEM — M25.561 ACUTE PAIN OF RIGHT KNEE: Status: ACTIVE | Noted: 2019-12-29

## 2019-12-29 NOTE — PROGRESS NOTES
"Procedure   Procedures      Knee Injection Procedure Note    Right knee injection was discussed with the patient in detail, including indication, risks, benefits, and alternatives. Verbal consent was given for the procedure. Injection was performed by physician.  Injection site was identified by physical examination and cleaned with Betadine and alcohol swabs. Prior to needle insertion, ethyl chloride spray was used for surface anesthesia. Sterile technique was used.  A 25-gauge, 1.5\" needle was directed to the joint from a(n) lateral approach. Injectate was passed into the joint space without difficulty. The needle was removed and a simple bandage was applied. The procedure was tolerated well without difficulty.    Injection mixture:  1% lidocaine without epinephrine: 1 mL  80 mg/mL Depo-Medrol: 1 mL  0.5% Bupivacaine (Marcaine): 1 mL    "

## 2019-12-31 PROBLEM — I83.12 VARICOSE VEINS OF BOTH LOWER EXTREMITIES WITH INFLAMMATION: Status: ACTIVE | Noted: 2019-12-31

## 2019-12-31 PROBLEM — G89.29 CHRONIC PAIN OF LEFT KNEE: Status: ACTIVE | Noted: 2019-12-31

## 2019-12-31 PROBLEM — I83.11 VARICOSE VEINS OF BOTH LOWER EXTREMITIES WITH INFLAMMATION: Status: ACTIVE | Noted: 2019-12-31

## 2019-12-31 PROBLEM — M25.562 CHRONIC PAIN OF LEFT KNEE: Status: ACTIVE | Noted: 2019-12-31

## 2019-12-31 RX ORDER — METHYLPREDNISOLONE ACETATE 80 MG/ML
80 INJECTION, SUSPENSION INTRA-ARTICULAR; INTRALESIONAL; INTRAMUSCULAR; SOFT TISSUE ONCE
Status: DISCONTINUED | OUTPATIENT
Start: 2019-12-31 | End: 2021-09-26

## 2019-12-31 NOTE — ASSESSMENT & PLAN NOTE
Due to her Varicose veins and the pain associated with them she was referred to a vascular surgeon for further evaluation and treatment.

## 2020-02-17 ENCOUNTER — LAB REQUISITION (OUTPATIENT)
Dept: LAB | Facility: HOSPITAL | Age: 65
End: 2020-02-17

## 2020-02-17 DIAGNOSIS — C50.411 MALIGNANT NEOPLASM OF UPPER-OUTER QUADRANT OF RIGHT FEMALE BREAST (HCC): ICD-10-CM

## 2020-03-24 LAB
LAB AP CASE REPORT: NORMAL
PATH REPORT.FINAL DX SPEC: NORMAL

## 2020-05-29 ENCOUNTER — LAB REQUISITION (OUTPATIENT)
Dept: LAB | Facility: HOSPITAL | Age: 65
End: 2020-05-29

## 2020-05-29 DIAGNOSIS — Z86.010 PERSONAL HISTORY OF COLONIC POLYPS: ICD-10-CM

## 2020-05-29 PROCEDURE — 88342 IMHCHEM/IMCYTCHM 1ST ANTB: CPT | Performed by: INTERNAL MEDICINE

## 2020-05-29 PROCEDURE — 88305 TISSUE EXAM BY PATHOLOGIST: CPT | Performed by: INTERNAL MEDICINE

## 2020-05-29 PROCEDURE — 88341 IMHCHEM/IMCYTCHM EA ADD ANTB: CPT | Performed by: INTERNAL MEDICINE

## 2020-07-07 LAB
LAB AP CASE REPORT: NORMAL
LAB AP DIAGNOSIS COMMENT: NORMAL
PATH REPORT.ADDENDUM SPEC: NORMAL
PATH REPORT.FINAL DX SPEC: NORMAL
PATH REPORT.GROSS SPEC: NORMAL

## 2020-07-20 ENCOUNTER — TRANSCRIBE ORDERS (OUTPATIENT)
Dept: ADMINISTRATIVE | Facility: HOSPITAL | Age: 65
End: 2020-07-20

## 2020-07-20 DIAGNOSIS — Z12.31 SCREENING MAMMOGRAM, ENCOUNTER FOR: Primary | ICD-10-CM

## 2020-07-20 NOTE — PROGRESS NOTES
PATIENTS ONCOLOGY RECORD LOCATED IN [facility]      Subjective     Name:  PENELOPE MIRELES     Date:  2017  Address:  [address] [place], [place] [address] [address]  Home: [phone]  :  1955 AGE:  62 y.o.        RECORDS OBTAINED:  Patients Oncology Record is located in [facility]   CPAP if needed  telemetry postop

## 2020-07-30 ENCOUNTER — TELEPHONE (OUTPATIENT)
Dept: FAMILY MEDICINE CLINIC | Facility: CLINIC | Age: 65
End: 2020-07-30

## 2020-07-30 NOTE — TELEPHONE ENCOUNTER
I spoke with this patient and Dr Camargo who advised she put heat to these areas- he feels this is vascular, not a blood clot.   She is OK to wait until Monday to be seen (per her request) I advised she can send a photo through her mychart and he may be able to determine something different form there- if she wants to.

## 2020-07-30 NOTE — TELEPHONE ENCOUNTER
[facility]/burning, knots on back of knee and on outside of knee. Please call to advise. She is currently scheduled on 8/3/20.

## 2020-08-03 ENCOUNTER — OFFICE VISIT (OUTPATIENT)
Dept: FAMILY MEDICINE CLINIC | Facility: CLINIC | Age: 65
End: 2020-08-03

## 2020-08-03 VITALS
RESPIRATION RATE: 18 BRPM | SYSTOLIC BLOOD PRESSURE: 130 MMHG | TEMPERATURE: 98 F | WEIGHT: 248.4 LBS | BODY MASS INDEX: 42.41 KG/M2 | OXYGEN SATURATION: 99 % | HEIGHT: 64 IN | DIASTOLIC BLOOD PRESSURE: 78 MMHG | HEART RATE: 80 BPM

## 2020-08-03 DIAGNOSIS — M25.561 CHRONIC PAIN OF RIGHT KNEE: Primary | ICD-10-CM

## 2020-08-03 DIAGNOSIS — G89.29 CHRONIC PAIN OF RIGHT KNEE: Primary | ICD-10-CM

## 2020-08-03 DIAGNOSIS — C78.5 BREAST CANCER METASTASIZED TO LARGE INTESTINE, UNSPECIFIED LATERALITY (HCC): ICD-10-CM

## 2020-08-03 DIAGNOSIS — C50.919 BREAST CANCER METASTASIZED TO LARGE INTESTINE, UNSPECIFIED LATERALITY (HCC): ICD-10-CM

## 2020-08-03 PROBLEM — Z90.710 H/O TOTAL HYSTERECTOMY WITH BILATERAL SALPINGO-OOPHORECTOMY (BSO): Status: ACTIVE | Noted: 2020-06-14

## 2020-08-03 PROBLEM — Z17.0: Status: ACTIVE | Noted: 2020-06-14

## 2020-08-03 PROBLEM — C50.911: Status: ACTIVE | Noted: 2020-06-14

## 2020-08-03 PROBLEM — Z90.79 H/O TOTAL HYSTERECTOMY WITH BILATERAL SALPINGO-OOPHORECTOMY (BSO): Status: ACTIVE | Noted: 2020-06-14

## 2020-08-03 PROBLEM — Z90.5 H/O RIGHT NEPHRECTOMY: Status: ACTIVE | Noted: 2020-06-14

## 2020-08-03 PROBLEM — Z90.722 H/O TOTAL HYSTERECTOMY WITH BILATERAL SALPINGO-OOPHORECTOMY (BSO): Status: ACTIVE | Noted: 2020-06-14

## 2020-08-03 PROCEDURE — 20610 DRAIN/INJ JOINT/BURSA W/O US: CPT | Performed by: FAMILY MEDICINE

## 2020-08-03 PROCEDURE — 99213 OFFICE O/P EST LOW 20 MIN: CPT | Performed by: FAMILY MEDICINE

## 2020-08-03 RX ORDER — METHYLPREDNISOLONE ACETATE 80 MG/ML
80 INJECTION, SUSPENSION INTRA-ARTICULAR; INTRALESIONAL; INTRAMUSCULAR; SOFT TISSUE ONCE
Status: COMPLETED | OUTPATIENT
Start: 2020-08-03 | End: 2020-08-03

## 2020-08-03 RX ADMIN — METHYLPREDNISOLONE ACETATE 80 MG: 80 INJECTION, SUSPENSION INTRA-ARTICULAR; INTRALESIONAL; INTRAMUSCULAR; SOFT TISSUE at 19:06

## 2020-08-03 NOTE — ASSESSMENT & PLAN NOTE
Patient was given a joint injection on 8/3/2020.  Patient was encouraged return to clinic as needed.  See procedure note for details.

## 2020-08-03 NOTE — PROGRESS NOTES
No chief complaint on file.      History of Present Illness:  Subjective   Celi Ordaz is a 65 y.o. female.   History of Present Illness   Knots in RLE:   Patient reports that she has noticed 2-3 small sized knots on the lateral right knee. She reports that her knee aches- she feels like she is walking on a stiff peg-leg. She reports that she did fall, she tripped over the  and then noticed the BB like bumps and her knee was aching more but she didn't feel anything pull or pop.   She also has a lump in the back of her right calf and there is a palpable lump. She has previously been seen for varicose veins and a referral was made to vascular surgery but her health took a turn in January and this visit wasn't set up.    Metastatic Cancer:   Patient had a colonoscopy in May 2020 and was told that she has metastatic lobular breast cancer to the colon. She previously had and completed breast cancer treatments 5 years ago and was in remission. She is now seeing Dr. Sandoval for her cancer. (He is with [facility]) She said her treatments are going well. She follows up with him next week for blood work then will see him at the end of the month.     Allergies:  No Known Allergies    Social History:  Social History     Socioeconomic History   • Marital status:      Spouse name: Not on file   • Number of children: Not on file   • Years of education: Not on file   • Highest education level: Not on file   Tobacco Use   • Smoking status: Never Smoker   Substance and Sexual Activity   • Alcohol use: No     Frequency: Never   • Drug use: No       Family History:  Family History   Problem Relation Age of Onset   • Diabetes Father    • Diabetes Sister    • Cancer Brother         colon   • Cancer Maternal Grandfather         colon   • Heart disease Paternal Grandfather    • Breast cancer Maternal Aunt        Past Medical History :  Active Ambulatory Problems     Diagnosis Date Noted   • Right leg pain 06/21/2019   •  Low back pain 06/21/2019   • Tinnitus of both ears 06/21/2019   • Anemia 06/21/2019   • Arthralgia of both lower legs 06/21/2019   • Arthritis 06/21/2019   • Conjunctivitis 04/15/2017   • Breast cancer (CMS/HCC) 09/18/2018   • Cancer of kidney, right (CMS/HCC) 06/21/2019   • Cervical spondylosis without myelopathy 08/15/2018   • Malignant neoplasm of upper-outer quadrant of right female breast (CMS/HCC) 06/21/2019   • Neck pain, acute 06/21/2019   • Osteoarthritis of lumbosacral spine with radiculopathy 08/15/2018   • Other spondylosis with radiculopathy, lumbar region 07/17/2019   • Chronic pain of right knee 12/29/2019   • Varicose veins of both lower extremities with inflammation 12/31/2019   • Chronic pain of left knee 12/31/2019   • H/O right nephrectomy 06/14/2020   • H/O total hysterectomy with bilateral salpingo-oophorectomy (BSO) 06/14/2020   • Breast cancer metastasized to large intestine, unspecified laterality (CMS/HCC) 06/16/2020   • Stage 2 lobular carcinoma of breast, ER+, right (CMS/HCC) 06/14/2020     Resolved Ambulatory Problems     Diagnosis Date Noted   • Need for immunization against influenza 06/21/2019     Past Medical History:   Diagnosis Date   • Hernia, abdominal    • History of chicken pox    • Mild acid reflux    • Overweight (BMI 25.0-29.9)    • Physical exam        Medication List:  Outpatient Encounter Medications as of 8/3/2020   Medication Sig Dispense Refill   • Calcium Carb-Cholecalciferol (CALCIUM + D3) 600-200 MG-UNIT tablet Take 1 tablet by mouth 2 (Two) Times a Day. 60 tablet 3   • diclofenac (VOLTAREN) 1 % gel gel Dose is 4 grams for knees, ankles, feet.  Dose is 2 grams for elbows, wrists, hands. Apply 4 times a day as needed. 1 tube 12   • fulvestrant (FASLODEX) 250 MG/5ML chemo syringe Inject  into the appropriate muscle as directed by prescriber 1 (One) Time. Unsure of dose @@ this time. Will be given monthly     • Palbociclib 125 MG capsule capsule Take 125 mg by mouth  "Daily. Take for 21 days on and then 7 days off (Ibrance) (Chemo medication for metastatic lobular breast cancer to the colon     • [DISCONTINUED] gabapentin (NEURONTIN) 300 MG capsule One nightly for a week then increase to every 12 hrs 60 capsule 0     Facility-Administered Encounter Medications as of 8/3/2020   Medication Dose Route Frequency Provider Last Rate Last Dose   • methylPREDNISolone acetate (DEPO-medrol) injection 80 mg  80 mg Intra-articular Once Yonathan Camargo Sr., MD           Past Surgical History:  Past Surgical History:   Procedure Laterality Date   • BREAST BIOPSY Right     core bx---right before 2015   • BREAST CYST EXCISION Bilateral    • BREAST LUMPECTOMY Right     before 2015   • CHOLECYSTECTOMY  07/2015   • HYSTERECTOMY     • KNEE LIGAMENT RECONSTRUCTION  05/2015   • LAPAROSCOPIC NEPHRECTOMY     • THYROIDECTOMY  2016   • TONSILLECTOMY AND ADENOIDECTOMY          The following portions of the patient's history were reviewed and updated as appropriate: allergies, current medications, past family history, past medical history, past social history, past surgical history and problem list.    Review Of Systems:  Review of Systems   Constitutional: Negative for fatigue and fever.   HENT: Negative for ear pain, postnasal drip and tinnitus.    Eyes: Negative for blurred vision and double vision.   Gastrointestinal: Negative for abdominal pain, nausea and vomiting.   Musculoskeletal: Positive for gait problem and joint swelling.        Right knee pain   Skin: Negative for rash and skin lesions.   Allergic/Immunologic: Negative for environmental allergies.   Neurological: Negative for dizziness, tremors, weakness, headache and confusion.   Psychiatric/Behavioral: Negative for hallucinations and suicidal ideas.       Objective     Physical Exam:  Vital Signs:  Visit Vitals  /78   Pulse 80   Temp 98 °F (36.7 °C)   Resp 18   Ht 162.6 cm (64\")   Wt 113 kg (248 lb 6.4 oz)   SpO2 99%   BMI 42.64 kg/m² "       Physical Exam   Constitutional: She appears well-developed and well-nourished. No distress.   HENT:   Head: Normocephalic.   Right Ear: External ear normal.   Left Ear: External ear normal.   Mouth/Throat: Oropharynx is clear and moist.   Eyes: Conjunctivae are normal. No scleral icterus.   Neck: Normal range of motion.   Cardiovascular: Normal rate, regular rhythm and normal heart sounds.   Pulmonary/Chest: Effort normal and breath sounds normal.   Musculoskeletal:        Right knee: She exhibits decreased range of motion and swelling. Tenderness found. Medial joint line tenderness noted.   Vitals reviewed.      Assessment/Plan   Assessment and Plan:  Diagnoses and all orders for this visit:    1. Chronic pain of right knee (Primary)  Assessment & Plan:  Patient was given a joint injection on 8/3/2020.  Patient was encouraged return to clinic as needed.  See procedure note for details.      2. Breast cancer metastasized to large intestine, unspecified laterality (CMS/HCC)  Assessment & Plan:  Patient is being followed by Dr. Sandoval.  Patient is aware of her recent colonoscopy results.

## 2020-08-03 NOTE — PROGRESS NOTES
"Procedure    Knee Injection Procedure Note    Right knee injection was discussed with the patient in detail, including indication, risks, benefits, and alternatives. Verbal consent was given for the procedure. Injection was performed by physician.  Injection site was identified by physical examination and cleaned with Betadine and alcohol swabs. Prior to needle insertion, ethyl chloride spray was used for surface anesthesia. Sterile technique was used.  A 25-gauge, 1.5\" needle was directed to the joint from a(n) medial approach. Injectate was passed into the joint space without difficulty. The needle was removed and a simple bandage was applied. The procedure was tolerated well without difficulty.    Injection mixture:  1% lidocaine without epinephrine: 1 mL  80 mg/mL Depo-Medrol: 1 mL  0.5% Bupivacaine (Marcaine): 1 mL  Procedures     "

## 2020-08-05 NOTE — PROGRESS NOTES
Chief Complaint   Patient presents with   • Leg Swelling     months    • Tinnitus     x 1 year    • Knee Pain     years    • Back Pain     years        History of Present Illness:  Subjective   Celi Ordaz is a 64 y.o. female.   Leg Swelling   This is a chronic problem. The current episode started more than 1 year ago. The problem occurs constantly. The problem has been gradually worsening. Associated symptoms include abdominal pain, a change in bowel habit and fatigue. Pertinent negatives include no anorexia, arthralgias, chest pain, chills, congestion, coughing, diaphoresis, fever, headaches, joint swelling, myalgias, nausea, neck pain, numbness, rash, sore throat, swollen glands, urinary symptoms, vertigo, visual change, vomiting or weakness. Associated symptoms comments: The patient states she has had this for some time and she states that her swelling gets worse as the day goes on and she states that it will gradually go down as she rest at night. . The symptoms are aggravated by standing, twisting, walking and bending. She has tried rest and sleep for the symptoms. The treatment provided no relief.   Tinnitus   This is a recurrent problem. The current episode started more than 1 year ago. The problem occurs constantly. The problem has been gradually worsening. Associated symptoms include abdominal pain, a change in bowel habit and fatigue. Pertinent negatives include no anorexia, arthralgias, chest pain, chills, congestion, coughing, diaphoresis, fever, headaches, joint swelling, myalgias, nausea, neck pain, numbness, rash, sore throat, swollen glands, urinary symptoms, vertigo, visual change, vomiting or weakness. Nothing (She states she has this all day long and she states that it can be worse and  she states like right now all she hears is back ground nosie) aggravates the symptoms. She has tried nothing for the symptoms. The treatment provided no relief.   Knee Pain    The incident occurred more than 1  normal appearance , without tenderness upon palpation , no deformities , trachea mid-line  week ago. The injury mechanism is unknown (She states her left knee has had a tear of her maniscus Dr. Patricio;alejandra olivera. She states she has had issues with Right as welll). The pain is present in the left knee, left foot, right leg, left leg, right foot, right ankle and left ankle. The quality of the pain is described as cramping and aching. The pain is at a severity of 5/10. The pain is moderate. The pain has been fluctuating since onset. Associated symptoms include muscle weakness and tingling. Pertinent negatives include no numbness. She reports no foreign bodies present. The symptoms are aggravated by movement and weight bearing. She has tried elevation for the symptoms. The treatment provided no relief.   Back Pain   This is a chronic problem. The current episode started more than 1 year ago (She states this has been ongoing for years. She states Dr. Long has told her a couple times after doing abdominal scans that her back is a mess. She has brought us a disc and would like us to look at it from her last abdominal scan). The problem occurs daily. The problem has been rapidly worsening since onset. The pain is present in the lumbar spine. The quality of the pain is described as shooting and aching (She states that it feels like someone is pulling a wire in her back Tingling and numbness). The pain radiates to the left foot, left knee, left thigh, right foot, right knee and right thigh. The pain is at a severity of 5/10. The pain is moderate. The pain is the same all the time. Associated symptoms include abdominal pain, bowel incontinence, leg pain and tingling. Pertinent negatives include no bladder incontinence, chest pain, dysuria, fever, headaches, numbness, paresis, paresthesias, pelvic pain, perianal numbness, weakness or weight loss. Risk factors include history of cancer (She has had kidney and breast cancer. She has also had a hysterectomy). She has tried nothing for the symptoms. The treatment provided no  relief.        Allergies:  No Known Allergies    Social History:  Social History     Socioeconomic History   • Marital status:      Spouse name: Not on file   • Number of children: Not on file   • Years of education: Not on file   • Highest education level: Not on file   Tobacco Use   • Smoking status: Never Smoker   Substance and Sexual Activity   • Alcohol use: No     Frequency: Never   • Drug use: No       Family History:  Family History   Problem Relation Age of Onset   • Diabetes Father    • Diabetes Sister    • Cancer Brother         colon   • Cancer Maternal Grandfather         colon   • Heart disease Paternal Grandfather        Past Medical History :  Active Ambulatory Problems     Diagnosis Date Noted   • Right leg pain 06/21/2019   • Low back pain 06/21/2019   • Ringing in the ear, bilateral 06/21/2019   • Anemia 06/21/2019   • Arthralgia of both lower legs 06/21/2019   • Arthritis 06/21/2019   • Conjunctivitis 04/15/2017   • Breast cancer (CMS/[facility]) 09/18/2018   • Cancer of kidney, right (CMS/[facility]) 06/21/2019   • Cervical spondylosis without myelopathy 08/15/2018   • Malignant neoplasm of upper-outer quadrant of right female breast (CMS/[facility]) 06/21/2019   • Neck pain, acute 06/21/2019   • Osteoarthritis of lumbosacral spine with radiculopathy 08/15/2018     Resolved Ambulatory Problems     Diagnosis Date Noted   • Need for immunization against influenza 06/21/2019     Past Medical History:   Diagnosis Date   • Anemia    • Arthralgia of both lower legs    • Arthritis    • Cancer of kidney, right (CMS/[facility])    • Hernia, abdominal    • History of chicken pox    • Low back pain    • Malignant neoplasm of upper-outer quadrant of right female breast (CMS/[facility])    • Mild acid reflux    • Neck pain, acute    • Overweight (BMI 25.0-29.9)    • Physical exam    • Right leg pain        Medication List:    Current Outpatient Medications:   •  gabapentin (NEURONTIN) 300 MG capsule, Take 1 capsule by mouth 3 (Three)  "Times a Day., Disp: , Rfl:   No current facility-administered medications for this visit.     Past Surgical History:  Past Surgical History:   Procedure Laterality Date   • CHOLECYSTECTOMY  07/2015   • KNEE LIGAMENT RECONSTRUCTION  05/2015   • LAPAROSCOPIC NEPHRECTOMY     • THYROIDECTOMY  2016   • TONSILLECTOMY AND ADENOIDECTOMY         Review Of Systems:  Review of Systems   Constitutional: Positive for fatigue. Negative for chills, diaphoresis, fever and unexpected weight loss.   HENT: Positive for tinnitus. Negative for congestion and sore throat.    Respiratory: Negative for cough.    Cardiovascular: Negative for chest pain.   Gastrointestinal: Positive for abdominal pain, bowel incontinence and change in bowel habit. Negative for anorexia, nausea and vomiting.   Genitourinary: Negative for urinary incontinence, dysuria and pelvic pain.   Musculoskeletal: Positive for back pain. Negative for arthralgias, joint swelling, myalgias and neck pain.   Skin: Negative for rash.   Neurological: Positive for tingling. Negative for vertigo, weakness, numbness and paresthesias.       The following portions of the patient's history were reviewed and updated as appropriate: allergies, current medications, past family history, past medical history, past social history, past surgical history and problem list.      Physical Exam:  Vital Signs:  /85   Pulse 83   Temp 98 °F (36.7 °C)   Resp 17   Ht 161.3 cm (63.5\")   Wt 112 kg (247 lb 12.8 oz)   SpO2 96%   Breastfeeding? No   BMI 43.21 kg/m²     Physical Exam   Constitutional: She is oriented to person, place, and time. She appears well-developed and well-nourished. She is active.   HENT:   Head: Normocephalic and atraumatic.   Nose: Nose normal.   Eyes: Conjunctivae and lids are normal. Pupils are equal, round, and reactive to light.   Neck: Normal range of motion. Neck supple.   Cardiovascular: Normal rate, regular rhythm, normal heart sounds and normal pulses. "   Pulmonary/Chest: Effort normal and breath sounds normal. No respiratory distress.   Abdominal: Soft. Bowel sounds are normal.   Musculoskeletal:        Right knee: She exhibits swelling. Tenderness found. Medial joint line tenderness noted.        Lumbar back: She exhibits tenderness and pain.   Neurological: She is alert and oriented to person, place, and time.   Skin: Skin is warm and dry. Capillary refill takes less than 2 seconds.   Psychiatric: She has a normal mood and affect. Her behavior is normal. Judgment and thought content normal.   Vitals reviewed.        Assessment and Plan:  Cancer of kidney, right (CMS/HCC)  Renal condition is unchanged.  Continue current treatment regimen.  Regular aerobic exercise.  Renal condition will be reassessed in 3 months.    Ringing in the ear, bilateral  This is likely her hearing worsening.  She was encouraged to be seen by ENT but she declined at this time.     Right leg pain  Her swelling on her legs are about the same and there is no pain.  This is likely just edema due to poor circulation.   She was given an injection in the right knee.     Low back pain  She was advised to call and set up an appt with Dr. Galaviz for her back pain and consider an MRI.  She was given back exercises and encouraged to use heat on her back.    Malignant neoplasm of upper-outer quadrant of right female breast (CMS/HCC)  Last chemo was Jan 2016 and Feb was last radiation.   She will be following up  She sees Dr. Charles seen Q3 months.  She has had a mammogram that was normal.  She stopped taking her medication due to the side effects.  She was strongly advised to restart her medicine.

## 2020-08-07 ENCOUNTER — HOSPITAL ENCOUNTER (OUTPATIENT)
Dept: MAMMOGRAPHY | Facility: HOSPITAL | Age: 65
Discharge: HOME | End: 2020-08-07
Admitting: OBSTETRICS & GYNECOLOGY

## 2020-08-07 DIAGNOSIS — Z12.31 SCREENING MAMMOGRAM, ENCOUNTER FOR: ICD-10-CM

## 2020-08-07 PROCEDURE — 77067 SCR MAMMO BI INCL CAD: CPT

## 2020-08-07 PROCEDURE — 77063 BREAST TOMOSYNTHESIS BI: CPT

## 2020-11-06 ENCOUNTER — OFFICE VISIT (OUTPATIENT)
Dept: SURGERY | Facility: CLINIC | Age: 65
End: 2020-11-06

## 2020-11-06 VITALS
OXYGEN SATURATION: 98 % | WEIGHT: 251 LBS | HEIGHT: 64 IN | BODY MASS INDEX: 42.85 KG/M2 | SYSTOLIC BLOOD PRESSURE: 125 MMHG | HEART RATE: 94 BPM | RESPIRATION RATE: 18 BRPM | TEMPERATURE: 97.1 F | DIASTOLIC BLOOD PRESSURE: 78 MMHG

## 2020-11-06 DIAGNOSIS — R22.2 CHEST WALL MASS: Primary | ICD-10-CM

## 2020-11-06 PROCEDURE — 99204 OFFICE O/P NEW MOD 45 MIN: CPT | Performed by: SURGERY

## 2020-11-06 RX ORDER — PALBOCICLIB 125 MG/1
1 TABLET, FILM COATED ORAL DAILY
COMMUNITY
Start: 2020-10-23

## 2020-11-09 NOTE — PROGRESS NOTES
GENERAL SURGERY CONSULTATION NOTE    Consult requested by: Dr. Camargo    Patient Care Team:  Yonathan Camargo Sr., MD as PCP - General    Reason for consult: Mass on left chest wall    Subjective     Patient is a 65 y.o. female presents with a newfound mass on her left chest wall.  The patient reportedly had right-sided breast cancer which was diagnosed back in 2015 and treated with breast conservation therapy.  At the time when it was located, the patient already had metastatic deposits within the right kidney and her gallbladder was removed at the same time as her right kidney was removed.  She then subsequently underwent adjuvant chemo and radiation therapy which she completed.  In September 2019 the patient reported persistent diarrhea and abdominal pain and was found to have metastatic breast cancer to her colon.  Since that time the patient has been treated with Ibrance and has been followed closely by her oncologist at [facility].  Today the patient reports a mass which is on the left chest wall inferolateral to the left breast.  She states that it is not necessarily painful but she has noticed it over the last year.  At first the area was soft, but has been getting slightly more firm as well as a slightly larger.  Given her history of metastatic cancer, the patient is concerned that this may be a metastatic deposit.  She has not noticed any skin changes overlying this area, drainage coming from this area, or associated pain.  She has not had any recent imaging performed of this area, but did have a screening mammogram performed on 8/7/2020 which demonstrated benign findings.    Review of Systems   Constitutional: Negative for appetite change, chills and fever.   HENT: Positive for tinnitus. Negative for congestion and sore throat.    Respiratory: Negative for cough and shortness of breath.    Cardiovascular: Negative for chest pain and palpitations.   Gastrointestinal: Positive for diarrhea. Negative for abdominal  pain, constipation, nausea, vomiting and GERD.   Genitourinary: Negative for difficulty urinating, dysuria and frequency.   Musculoskeletal: Positive for arthralgias and back pain.   Skin: Positive for skin lesions. Negative for rash.   Neurological: Negative for dizziness, seizures and memory problem.   Hematological: Negative for adenopathy. Does not bruise/bleed easily.   Psychiatric/Behavioral: Negative for sleep disturbance and depressed mood.        History  Past Medical History:   Diagnosis Date   • Anemia    • Arthralgia of both lower legs     Impression: She was started on Gabapentin to treat her symptoms.   • Arthritis    • Cancer of kidney, right (CMS/HCC)     Impression: She is stable.   • Hernia, abdominal    • History of chicken pox    • Hx of radiation therapy 2015    right breast cancer   • Low back pain     Impression: Xray of her Lumbar spine was ordered and read by me. She was referred to Ortho for further evaluation and treatment.   • Malignant neoplasm of upper-outer quadrant of right female breast (CMS/HCC) 2015    Right restricted extrimity   • Mild acid reflux     Impression: She was started on Omeprazole to treat her symptoms.   • Neck pain, acute     Impression: Xray of neck due to her complaints of pain. Xray was ordered and read by me. She was advised to use OTC NSAIDs to treat her symptoms. She was referred to Ortho for further evaluation and treatment.   • Overweight (BMI 25.0-29.9)    • Physical exam     Impression: Discussed injury prevention, diet and exercise, safe sexual practices, and screening for common diseases. Encouraged use of sunscreen and seatbelts. Discussed timing of cervical cancer screening. Encouraged monthly self-breast exams, yearly clinical breast exams, and discussed timing of mammograms. Avoidance of tobacco encouraged. Limitation or avoidance of alcohol encouraged. Recommen   • Right leg pain     Impression: U/S of right leg to r/o DVT.     Past Surgical  History:   Procedure Laterality Date   • BREAST BIOPSY Right     core bx---right before 2015   • BREAST CYST EXCISION Bilateral    • BREAST EXCISIONAL BIOPSY Left    • BREAST LUMPECTOMY Right     before 2015   • CHOLECYSTECTOMY  07/2015   • HYSTERECTOMY     • KNEE LIGAMENT RECONSTRUCTION  05/2015   • LAPAROSCOPIC NEPHRECTOMY     • THYROIDECTOMY  2016   • TONSILLECTOMY AND ADENOIDECTOMY       Family History   Problem Relation Age of Onset   • Diabetes Father    • Diabetes Sister    • Cancer Brother         colon   • Cancer Maternal Grandfather         colon   • Heart disease Paternal Grandfather    • Breast cancer Maternal Aunt      Social History     Tobacco Use   • Smoking status: Never Smoker   • Smokeless tobacco: Never Used   Substance Use Topics   • Alcohol use: No     Frequency: Never   • Drug use: No     (Not in a hospital admission)    Allergies:  Patient has no known allergies.    Objective     Vital Signs       Physical Exam  Vitals signs reviewed. Exam conducted with a chaperone present.   Constitutional:       Appearance: She is well-developed.   HENT:      Head: Normocephalic and atraumatic.   Eyes:      Pupils: Pupils are equal, round, and reactive to light.   Neck:      Musculoskeletal: Normal range of motion.   Cardiovascular:      Rate and Rhythm: Normal rate and regular rhythm.   Pulmonary:      Effort: Pulmonary effort is normal.      Breath sounds: Normal breath sounds.   Chest:      Comments: On the left lateral aspect of the patient's chest wall there is an approximately 3 x 1 cm ovoid soft tissue mass with normal skin overlying the area.  It is mobile within the soft tissues.  Abdominal:      General: There is no distension.      Palpations: Abdomen is soft.      Tenderness: There is no abdominal tenderness.      Hernia: No hernia is present.   Musculoskeletal: Normal range of motion.   Lymphadenopathy:      Cervical: No cervical adenopathy.   Skin:     General: Skin is warm and dry.       Findings: No rash.   Neurological:      Mental Status: She is alert and oriented to person, place, and time.         Results Review:   Lab Results (last 24 hours)     ** No results found for the last 24 hours. **        No radiology results for the last day      I reviewed the patient's new imaging results and agree with the interpretation.  I reviewed the patient's other test results and agree with the interpretation    Assessment/Plan     Active Problems:  Left chest wall mass    Had a long discussion with the patient regarding her past medical history which is significant for metastatic breast cancer which is still being treated with Ibrance.  I am not sure whether this is a simple lipoma or a potential metastatic deposit and I do believe that biopsy is warranted.  Prior to proceeding to the operating room however the patient is due to have further imaging performed to monitor the progression of her disease.  She is scheduled to have a CT scan of the chest and abdomen and pelvis.  I have requested that the patient receive those tests and have the images forwarded to me so that I may review them prior to proceeding to the operating room.  As long as there is not evidence of a metastatic deposit which is attached to the chest wall, then I would likely proceed for a local excision.  Once I have had the opportunity to review these images, I will discuss this with the patient and we will proceed with surgery.    Izaiah Naylor MD  11/09/20  14:21 EST

## 2021-08-26 ENCOUNTER — TRANSCRIBE ORDERS (OUTPATIENT)
Dept: ADMINISTRATIVE | Facility: HOSPITAL | Age: 66
End: 2021-08-26

## 2021-08-26 DIAGNOSIS — Z12.31 ENCOUNTER FOR SCREENING MAMMOGRAM FOR BREAST CANCER: Primary | ICD-10-CM

## 2021-08-30 ENCOUNTER — HOSPITAL ENCOUNTER (OUTPATIENT)
Dept: MAMMOGRAPHY | Facility: HOSPITAL | Age: 66
Discharge: HOME | End: 2021-08-30
Admitting: OBSTETRICS & GYNECOLOGY

## 2021-08-30 DIAGNOSIS — Z12.31 ENCOUNTER FOR SCREENING MAMMOGRAM FOR BREAST CANCER: ICD-10-CM

## 2021-08-30 PROCEDURE — 77067 SCR MAMMO BI INCL CAD: CPT

## 2021-08-30 PROCEDURE — 77063 BREAST TOMOSYNTHESIS BI: CPT

## 2021-10-06 DIAGNOSIS — J01.40 ACUTE PANSINUSITIS, RECURRENCE NOT SPECIFIED: ICD-10-CM

## 2021-10-06 RX ORDER — AMOXICILLIN AND CLAVULANATE POTASSIUM 875; 125 MG/1; MG/1
1 TABLET, FILM COATED ORAL 2 TIMES DAILY
Qty: 20 TABLET | Refills: 0 | Status: SHIPPED | OUTPATIENT
Start: 2021-10-06 | End: 2022-09-30 | Stop reason: SDUPTHER

## 2022-08-22 ENCOUNTER — TRANSCRIBE ORDERS (OUTPATIENT)
Dept: ADMINISTRATIVE | Facility: HOSPITAL | Age: 67
End: 2022-08-22

## 2022-08-22 DIAGNOSIS — Z12.31 VISIT FOR SCREENING MAMMOGRAM: Primary | ICD-10-CM

## 2022-09-01 ENCOUNTER — HOSPITAL ENCOUNTER (OUTPATIENT)
Dept: MAMMOGRAPHY | Facility: HOSPITAL | Age: 67
Discharge: HOME | End: 2022-09-01
Admitting: OBSTETRICS & GYNECOLOGY

## 2022-09-01 DIAGNOSIS — Z12.31 VISIT FOR SCREENING MAMMOGRAM: ICD-10-CM

## 2022-09-01 PROCEDURE — 77067 SCR MAMMO BI INCL CAD: CPT

## 2022-09-01 PROCEDURE — 77063 BREAST TOMOSYNTHESIS BI: CPT

## 2022-10-24 ENCOUNTER — TELEPHONE (OUTPATIENT)
Dept: FAMILY MEDICINE CLINIC | Facility: CLINIC | Age: 67
End: 2022-10-24

## 2022-10-24 NOTE — TELEPHONE ENCOUNTER
Caller: Celi Ordaz    Relationship to patient: Self    Best call back number: [phone]    Type of visit: NEW PATIENT WITH DR YADAV    Requested date: WEEK OF 11/14    If rescheduling, when is the original appointment: 11/15    Additional notes: PATIENT CAN NOT BE IN ON 11/15, PLEASE ADVISE IF SHE CAN COME IN A DIFFERENT DAY THAT WEEK. NO CURRENT OPENINGS.

## 2022-11-16 ENCOUNTER — TRANSCRIBE ORDERS (OUTPATIENT)
Dept: ADMINISTRATIVE | Facility: HOSPITAL | Age: 67
End: 2022-11-16

## 2022-11-16 DIAGNOSIS — C18.9 MALIGNANT NEOPLASM OF COLON, UNSPECIFIED PART OF COLON: ICD-10-CM

## 2022-11-16 DIAGNOSIS — N95.1 MENOPAUSAL STATE: Primary | ICD-10-CM

## 2022-11-16 DIAGNOSIS — C50.911 MALIGNANT NEOPLASM OF RIGHT FEMALE BREAST, UNSPECIFIED ESTROGEN RECEPTOR STATUS, UNSPECIFIED SITE OF BREAST: ICD-10-CM

## 2022-12-08 ENCOUNTER — APPOINTMENT (OUTPATIENT)
Dept: BONE DENSITY | Facility: HOSPITAL | Age: 67
End: 2022-12-08

## 2023-01-06 NOTE — PROGRESS NOTES
Subjective   Celi Ordaz is a 67 y.o. female.   No chief complaint on file.    History of Present Illness     The following portions of the patient's history were reviewed and updated as appropriate: allergies, current medications, past family history, past medical history, past social history, past surgical history and problem list.    Patient Active Problem List   Diagnosis   • Right leg pain   • Low back pain   • Tinnitus of both ears   • Anemia   • Arthralgia of both lower legs   • Arthritis   • Conjunctivitis   • Breast cancer (HCC)   • Cancer of kidney, right (HCC)   • Cervical spondylosis without myelopathy   • Malignant neoplasm of upper-outer quadrant of right female breast (HCC)   • Neck pain, acute   • Osteoarthritis of lumbosacral spine with radiculopathy   • Other spondylosis with radiculopathy, lumbar region   • Chronic pain of right knee   • Varicose veins of both lower extremities with inflammation   • Chronic pain of left knee   • H/O right nephrectomy   • H/O total hysterectomy with bilateral salpingo-oophorectomy (BSO)   • Breast cancer metastasized to large intestine, unspecified laterality (HCC)   • Stage 2 lobular carcinoma of breast, ER+, right (HCC)       Current Outpatient Medications on File Prior to Visit   Medication Sig Dispense Refill   • cefdinir (OMNICEF) 300 MG capsule Take 1 capsule by mouth 2 (Two) Times a Day. 14 capsule 0   • Chlorcyclizine-Pseudoephed 25-60 MG tablet Take 1 tablet by mouth Every 8 (Eight) Hours As Needed (congestion). 30 tablet 0   • diclofenac (VOLTAREN) 1 % gel gel Dose is 4 grams for knees, ankles, feet.  Dose is 2 grams for elbows, wrists, hands. Apply 4 times a day as needed. 1 tube 12   • diphenoxylate-atropine (LOMOTIL) 2.5-0.025 MG per tablet      • fulvestrant (FASLODEX) 250 MG/5ML chemo syringe Inject  into the appropriate muscle as directed by prescriber 1 (One) Time. Unsure of dose @@ this time. Will be given monthly     • Ibrance 125 MG tablet  Take 1 tablet by mouth Daily. Take daily for 3 weeks then off one week, then repeat     • ibuprofen (ADVIL,MOTRIN) 600 MG tablet      • temazepam (RESTORIL) 15 MG capsule        No current facility-administered medications on file prior to visit.     Current outpatient and discharge medications have been reconciled for the patient.  Reviewed by: Iman Fofana MA      No Known Allergies      Objective   There were no vitals taken for this visit.    Physical Exam      There are no diagnoses linked to this encounter.                Follow Up  -     Expected course, medications, and adverse effects discussed as appropriate.  Call or return if worsening or persistent symptoms.  I wore protective equipment throughout this patient encounter to include mask and eye protection. Hand hygiene was performed before donning protective equipment and after removal when leaving the room.    This document is intended for medical expert use only. Reading of this document by patients and/or patient's family without participating medical staff guidance may result in misinterpretation and unintended morbidity. Any interpretation of such data is the responsibility of the patient and/or family member responsible for the patient in concert with their primary or specialist providers, not to be left for sources of online searches such as [employer], [employer] or similar queries. Relying on these approaches to knowledge may result in misinterpretation, misguided goals of care and even death should patients or family members try recommendations outside of the realm of professional medical care.

## 2023-01-09 ENCOUNTER — OFFICE VISIT (OUTPATIENT)
Dept: FAMILY MEDICINE CLINIC | Facility: CLINIC | Age: 68
End: 2023-01-09
Payer: MEDICARE

## 2023-01-09 VITALS
TEMPERATURE: 97.2 F | WEIGHT: 247.4 LBS | RESPIRATION RATE: 16 BRPM | HEART RATE: 66 BPM | BODY MASS INDEX: 42.24 KG/M2 | HEIGHT: 64 IN | SYSTOLIC BLOOD PRESSURE: 124 MMHG | OXYGEN SATURATION: 97 % | DIASTOLIC BLOOD PRESSURE: 68 MMHG

## 2023-01-09 DIAGNOSIS — Z11.59 ENCOUNTER FOR HEPATITIS C SCREENING TEST FOR LOW RISK PATIENT: ICD-10-CM

## 2023-01-09 DIAGNOSIS — Z13.1 DIABETES MELLITUS SCREENING: ICD-10-CM

## 2023-01-09 DIAGNOSIS — R79.9 ABNORMAL FINDING OF BLOOD CHEMISTRY, UNSPECIFIED: ICD-10-CM

## 2023-01-09 DIAGNOSIS — E66.01 MORBIDLY OBESE: ICD-10-CM

## 2023-01-09 DIAGNOSIS — Z13.220 SCREENING CHOLESTEROL LEVEL: ICD-10-CM

## 2023-01-09 DIAGNOSIS — M79.605 LOWER EXTREMITY PAIN, BILATERAL: ICD-10-CM

## 2023-01-09 DIAGNOSIS — M79.604 LOWER EXTREMITY PAIN, BILATERAL: ICD-10-CM

## 2023-01-09 DIAGNOSIS — Z00.00 MEDICARE ANNUAL WELLNESS VISIT, SUBSEQUENT: Primary | ICD-10-CM

## 2023-01-09 PROCEDURE — 1159F MED LIST DOCD IN RCRD: CPT | Performed by: STUDENT IN AN ORGANIZED HEALTH CARE EDUCATION/TRAINING PROGRAM

## 2023-01-09 PROCEDURE — G0439 PPPS, SUBSEQ VISIT: HCPCS | Performed by: STUDENT IN AN ORGANIZED HEALTH CARE EDUCATION/TRAINING PROGRAM

## 2023-01-09 PROCEDURE — 99213 OFFICE O/P EST LOW 20 MIN: CPT | Performed by: STUDENT IN AN ORGANIZED HEALTH CARE EDUCATION/TRAINING PROGRAM

## 2023-01-09 RX ORDER — ACETAMINOPHEN 325 MG/1
325 TABLET ORAL EVERY 6 HOURS PRN
COMMUNITY

## 2023-01-09 NOTE — PROGRESS NOTES
The ABCs of the Annual Wellness Visit  Initial Medicare Wellness Visit    Subjective     Celi Ordaz is a 67 y.o. female who presents for an Initial Medicare Wellness Visit.    The following portions of the patient's history were reviewed and   updated as appropriate: allergies, current medications, past family history, past medical history, past social history, past surgical history and problem list.     Compared to one year ago, the patient feels her physical   health is the same.    Compared to one year ago, the patient feels her mental   health is the same.    Recent Hospitalizations:  She was not admitted to the hospital during the last year.       Current Medical Providers:  Patient Care Team:  Juliana Berry DO as PCP - General (Family Medicine)  Wily Reddy MD as Consulting Physician (Hematology and Oncology)    Outpatient Medications Prior to Visit   Medication Sig Dispense Refill   • acetaminophen (TYLENOL) 325 MG tablet Take 325 mg by mouth Every 6 (Six) Hours As Needed for Mild Pain. PRN     • diclofenac (VOLTAREN) 1 % gel gel Dose is 4 grams for knees, ankles, feet.  Dose is 2 grams for elbows, wrists, hands. Apply 4 times a day as needed. 1 tube 12   • diphenoxylate-atropine (LOMOTIL) 2.5-0.025 MG per tablet      • fulvestrant (FASLODEX) 250 MG/5ML chemo syringe Inject  into the appropriate muscle as directed by prescriber 1 (One) Time. Unsure of dose @@ this time. Will be given monthly     • Ibrance 125 MG tablet Take 1 tablet by mouth Daily. Take daily for 3 weeks then off one week, then repeat     • temazepam (RESTORIL) 15 MG capsule      • cefdinir (OMNICEF) 300 MG capsule Take 1 capsule by mouth 2 (Two) Times a Day. 14 capsule 0   • Chlorcyclizine-Pseudoephed 25-60 MG tablet Take 1 tablet by mouth Every 8 (Eight) Hours As Needed (congestion). 30 tablet 0   • ibuprofen (ADVIL,MOTRIN) 600 MG tablet        No facility-administered medications prior to visit.       No opioid medication  identified on active medication list. I have reviewed chart for other potential  high risk medication/s and harmful drug interactions in the elderly.          Aspirin is not on active medication list.  Aspirin use is not indicated based on review of current medical condition/s. Risk of harm outweighs potential benefits.  .    Patient Active Problem List   Diagnosis   • Right leg pain   • Low back pain   • Tinnitus of both ears   • Anemia   • Arthralgia of both lower legs   • Arthritis   • Conjunctivitis   • Breast cancer (HCC)   • Cancer of kidney, right (HCC)   • Cervical spondylosis without myelopathy   • Malignant neoplasm of upper-outer quadrant of right female breast (HCC)   • Neck pain, acute   • Osteoarthritis of lumbosacral spine with radiculopathy   • Other spondylosis with radiculopathy, lumbar region   • Chronic pain of right knee   • Varicose veins of both lower extremities with inflammation   • Chronic pain of left knee   • H/O right nephrectomy   • H/O total hysterectomy with bilateral salpingo-oophorectomy (BSO)   • Breast cancer metastasized to large intestine, unspecified laterality (HCC)   • Stage 2 lobular carcinoma of breast, ER+, right (HCC)   • Morbidly obese (HCC)     Advance Care Planning  Advance Directive is on file.  ACP discussion was held with the patient during this visit. spent about 16 minutes of discussion with pt on ACP       Objective    Vitals:    01/09/23 1415   BP: 124/68   BP Location: Left arm   Patient Position: Sitting   Cuff Size: Large Adult   Pulse: 66   Resp: 16   Temp: 97.2 °F (36.2 °C)   TempSrc: Skin   SpO2: 97%   Weight: 112 kg (247 lb 6.4 oz)   Height: 162.6 cm (64\")     Estimated body mass index is 42.47 kg/m² as calculated from the following:    Height as of this encounter: 162.6 cm (64\").    Weight as of this encounter: 112 kg (247 lb 6.4 oz).    Class 3 Severe Obesity (BMI >=40). Obesity-related health conditions include the following: none. Obesity is  unchanged. BMI is is above average; BMI management plan is completed. We discussed portion control and increasing exercise.      Does the patient have evidence of cognitive impairment?   No          HEALTH RISK ASSESSMENT    Smoking Status:  Social History     Tobacco Use   Smoking Status Never   Smokeless Tobacco Never     Alcohol Consumption:  Social History     Substance and Sexual Activity   Alcohol Use No     Fall Risk Screen:    MICHAEL Fall Risk Assessment has not been completed.    Depression Screen:   PHQ-2/PHQ-9 Depression Screening 1/9/2023   Little Interest or Pleasure in Doing Things 1-->several days   Feeling Down, Depressed or Hopeless 1-->several days   Trouble Falling or Staying Asleep, or Sleeping Too Much 0-->not at all   Feeling Tired or Having Little Energy 0-->not at all   Poor Appetite or Overeating 0-->not at all   Feeling Bad about Yourself - or that You are a Failure or Have Let Yourself or Your Family Down 0-->not at all   Trouble Concentrating on Things, Such as Reading the Newspaper or Watching Television 0-->not at all   Moving or Speaking So Slowly that Other People Could Have Noticed? Or the Opposite - Being So Fidgety 0-->not at all   Thoughts that You Would be Better Off Dead or of Hurting Yourself in Some Way 0-->not at all   PHQ-9: Brief Depression Severity Measure Score 2   If You Checked Off Any Problems, How Difficult Have These Problems Made It For You to Do Your Work, Take Care of Things at Home, or Get Along with Other People? not difficult at all       Health Habits and Functional and Cognitive Screening:  Functional & Cognitive Status 1/9/2023   Do you have difficulty preparing food and eating? No   Do you have difficulty bathing yourself, getting dressed or grooming yourself? No   Do you have difficulty using the toilet? No   Do you have difficulty moving around from place to place? No   Do you have trouble with steps or getting out of a bed or a chair? Yes   Current Diet  Well Balanced Diet   Dental Exam Up to date        Dental Exam Comment Dr. Alicea   Eye Exam Not up to date        Eye Exam Comment Dr. Burleson   Exercise (times per week) 7 times per week   Current Exercises Include Walking   Do you need help using the phone?  No   Are you deaf or do you have serious difficulty hearing?  No   Do you need help with transportation? No   Do you need help shopping? No   Do you need help preparing meals?  No   Do you need help with housework?  No   Do you need help with laundry? No   Do you need help taking your medications? No   Do you need help managing money? No   Do you ever drive or ride in a car without wearing a seat belt? No   Have you felt unusual stress, anger or loneliness in the last month? Yes   Who do you live with? Spouse   If you need help, do you have trouble finding someone available to you? No   Have you been bothered in the last four weeks by sexual problems? No   Do you have difficulty concentrating, remembering or making decisions? No       Age-appropriate Screening Schedule:  Refer to the list below for future screening recommendations based on patient's age, sex and/or medical conditions. Orders for these recommended tests are listed in the plan section. The patient has been provided with a written plan.    Health Maintenance   Topic Date Due   • TDAP/TD VACCINES (1 - Tdap) Never done   • ZOSTER VACCINE (1 of 2) Never done   • PAP SMEAR  Never done   • MAMMOGRAM  09/01/2023   • DXA SCAN  12/08/2024   • COLONOSCOPY  05/29/2030   • INFLUENZA VACCINE  Completed          CMS Preventative Services Quick Reference  Risk Factors Identified During Encounter    None    The above risks/problems have been discussed with the patient.  Pertinent information has been shared with the patient in the After Visit Summary.  An After Visit Summary and PPPS were made available to the patient.  Diagnoses and all orders for this visit:    1. Medicare annual wellness visit, subsequent  (Primary)    2. Diabetes mellitus screening  -     Hemoglobin A1c    3. Encounter for hepatitis C screening test for low risk patient  -     Hepatitis C Antibody    4. Screening cholesterol level  -     Lipid Panel With / Chol / HDL Ratio    5. Abnormal finding of blood chemistry, unspecified  -     Hemoglobin A1c  -     Iron Profile  -     Ferritin    6. Lower extremity pain, bilateral  -     Iron Profile  -     Ferritin    7. Morbidly obese (HCC)  Assessment & Plan:  Patient's (Body mass index is 42.47 kg/m².) indicates that they are morbidly/severely obese (BMI > 40 or > 35 with obesity - related health condition) with health conditions that include cancer . Weight is unchanged. BMI is is above average; BMI management plan is completed. We discussed portion control and increasing exercise.     Orders:  -     Hemoglobin A1c  -     Lipid Panel With / Chol / HDL Ratio    Follow Up:  Next Medicare Wellness visit to be scheduled in 1 year.        Additional E&M Note during same encounter follows:  Patient has multiple medical problems which are significant and separately identifiable that require additional work above and beyond the Medicare Wellness Visit.      Chief Complaint  Establish Care and Medicare Wellness-Initial Visit      Objective   Vital Signs:  /68 (BP Location: Left arm, Patient Position: Sitting, Cuff Size: Large Adult)   Pulse 66   Temp 97.2 °F (36.2 °C) (Skin)   Resp 16   Ht 162.6 cm (64\")   Wt 112 kg (247 lb 6.4 oz)   SpO2 97%   BMI 42.47 kg/m²          Assessment and Plan   Diagnoses and all orders for this visit:    1. Medicare annual wellness visit, subsequent (Primary)  -ACP paperwork was reviewed with patient and patient was sent home with this paperwork.  She is going to review this and fill this out at home and drop this off at the office in about 1 week    2. Diabetes mellitus screening  -     Hemoglobin A1c    3. Encounter for hepatitis C screening test for low risk patient  -      Hepatitis C Antibody    4. Screening cholesterol level  -     Lipid Panel With / Chol / HDL Ratio    5. Abnormal finding of blood chemistry, unspecified  -     Hemoglobin A1c  -     Iron Profile  -     Ferritin    6. Lower extremity pain, bilateral  -     Iron Profile  -     Ferritin    7. Morbidly obese (HCC)  Assessment & Plan:  Patient's (Body mass index is 42.47 kg/m².) indicates that they are morbidly/severely obese (BMI > 40 or > 35 with obesity - related health condition) with health conditions that include cancer . Weight is unchanged. BMI is is above average; BMI management plan is completed. We discussed portion control and increasing exercise.     Orders:  -     Hemoglobin A1c  -     Lipid Panel With / Chol / HDL Ratio        Follow Up   Return in about 1 year (around 1/9/2024) for Medicare Wellness.  Patient was given instructions and counseling regarding her condition or for health maintenance advice. Please see specific information pulled into the AVS if appropriate.

## 2023-01-09 NOTE — ASSESSMENT & PLAN NOTE
Patient's (Body mass index is 42.47 kg/m².) indicates that they are morbidly/severely obese (BMI > 40 or > 35 with obesity - related health condition) with health conditions that include cancer . Weight is unchanged. BMI is is above average; BMI management plan is completed. We discussed portion control and increasing exercise.

## 2023-01-10 LAB
FERRITIN SERPL-MCNC: 145 NG/ML (ref 15–150)
IRON SATN MFR SERPL: 15 % (ref 15–55)
IRON SERPL-MCNC: 59 UG/DL (ref 27–139)
TIBC SERPL-MCNC: 395 UG/DL (ref 250–450)
UIBC SERPL-MCNC: 336 UG/DL (ref 118–369)

## 2023-01-10 NOTE — PROGRESS NOTES
Burning sensation in lower extremities  -Patient has noticed this in the last few months  -Patient is taking Ibrance which can cause some anesthesia (this may be a lower/alternate manifestation of this potential side effect) and fulvestrant which can cause some limb pain  -Hemoglobin seems to be normal but will check iron just to ensure that this is not a potential cause or encouraging factor

## 2023-01-12 ENCOUNTER — PATIENT ROUNDING (BHMG ONLY) (OUTPATIENT)
Dept: FAMILY MEDICINE CLINIC | Facility: CLINIC | Age: 68
End: 2023-01-12
Payer: MEDICARE

## 2023-01-12 NOTE — PROGRESS NOTES
January 12, 2023    Hello, may I speak with Celi Ordaz?    My name is Yudi      I am  with [facility]  [address] [place]  [place] [address].    Before we get started may I verify your date of birth? 1955    I am calling to officially welcome you to our practice and ask about your recent visit. Is this a good time to talk? yes    Tell me about your visit with us. What things went well?  everything was fine       We're always looking for ways to make our patients' experiences even better. Do you have recommendations on ways we may improve?  no    Overall were you satisfied with your first visit to our practice? yes       I appreciate you taking the time to speak with me today. Is there anything else I can do for you? no      Thank you, and have a great day.

## 2023-04-25 LAB
CHOLEST SERPL-MCNC: 283 MG/DL (ref 100–199)
CHOLEST/HDLC SERPL: 3.8 RATIO (ref 0–4.4)
HBA1C MFR BLD: 6.5 % (ref 4.8–5.6)
HCV IGG SERPL QL IA: NON REACTIVE
HDLC SERPL-MCNC: 74 MG/DL
LDLC SERPL CALC-MCNC: 183 MG/DL (ref 0–99)
TRIGL SERPL-MCNC: 145 MG/DL (ref 0–149)
VLDLC SERPL CALC-MCNC: 26 MG/DL (ref 5–40)

## 2023-04-28 ENCOUNTER — TELEPHONE (OUTPATIENT)
Dept: FAMILY MEDICINE CLINIC | Facility: CLINIC | Age: 68
End: 2023-04-28
Payer: MEDICARE

## 2023-04-28 DIAGNOSIS — E11.65 TYPE 2 DIABETES MELLITUS WITH HYPERGLYCEMIA, WITHOUT LONG-TERM CURRENT USE OF INSULIN: ICD-10-CM

## 2023-04-28 DIAGNOSIS — E78.2 MIXED HYPERLIPIDEMIA: Primary | ICD-10-CM

## 2023-04-28 RX ORDER — ATORVASTATIN CALCIUM 10 MG/1
10 TABLET, FILM COATED ORAL DAILY
Qty: 90 TABLET | Refills: 1 | Status: SHIPPED | OUTPATIENT
Start: 2023-04-28

## 2023-04-28 RX ORDER — METFORMIN HYDROCHLORIDE 500 MG/1
500 TABLET, EXTENDED RELEASE ORAL
Qty: 90 TABLET | Refills: 1 | Status: SHIPPED | OUTPATIENT
Start: 2023-04-28

## 2023-04-28 NOTE — TELEPHONE ENCOUNTER
Patient agreeable to metformin and statin.  Sending in metformin  mg daily and atorvastatin 10 mg daily    We will recheck cholesterol and CMP in 3 months, will also recheck A1c in 3 months

## 2023-04-28 NOTE — TELEPHONE ENCOUNTER
LAKIA 04/28/2023, 5 pm advised pt medication has been sent in.  Will check A1c and cholesterol  in 3 months.  If any side effects with medication, please call the office

## 2023-05-01 ENCOUNTER — HOSPITAL ENCOUNTER (OUTPATIENT)
Dept: CARDIOLOGY | Facility: HOSPITAL | Age: 68
Discharge: HOME | End: 2023-05-01
Payer: MEDICARE

## 2023-05-01 ENCOUNTER — LAB (OUTPATIENT)
Dept: LAB | Facility: HOSPITAL | Age: 68
End: 2023-05-01
Payer: MEDICARE

## 2023-05-01 ENCOUNTER — TRANSCRIBE ORDERS (OUTPATIENT)
Dept: ADMINISTRATIVE | Facility: HOSPITAL | Age: 68
End: 2023-05-01
Payer: MEDICARE

## 2023-05-01 ENCOUNTER — HOSPITAL ENCOUNTER (OUTPATIENT)
Dept: GENERAL RADIOLOGY | Facility: HOSPITAL | Age: 68
Discharge: HOME | End: 2023-05-01
Payer: MEDICARE

## 2023-05-01 DIAGNOSIS — Z01.818 PRE-OP TESTING: ICD-10-CM

## 2023-05-01 DIAGNOSIS — Z01.818 PRE-OP TESTING: Primary | ICD-10-CM

## 2023-05-01 LAB
APTT PPP: 27.4 SECONDS (ref 24–31)
BASOPHILS # BLD AUTO: 0.08 10*3/MM3 (ref 0–0.2)
BASOPHILS NFR BLD AUTO: 2.1 % (ref 0–1.5)
BILIRUB UR QL STRIP: NEGATIVE
CLARITY UR: CLEAR
COLOR UR: ABNORMAL
DEPRECATED RDW RBC AUTO: 49.1 FL (ref 37–54)
EOSINOPHIL # BLD AUTO: 0.04 10*3/MM3 (ref 0–0.4)
EOSINOPHIL NFR BLD AUTO: 1.1 % (ref 0.3–6.2)
ERYTHROCYTE [DISTWIDTH] IN BLOOD BY AUTOMATED COUNT: 13.4 % (ref 12.3–15.4)
GLUCOSE UR STRIP-MCNC: NEGATIVE MG/DL
HCT VFR BLD AUTO: 38.4 % (ref 34–46.6)
HGB BLD-MCNC: 12.8 G/DL (ref 12–15.9)
HGB UR QL STRIP.AUTO: NEGATIVE
IMM GRANULOCYTES # BLD AUTO: 0.01 10*3/MM3 (ref 0–0.05)
IMM GRANULOCYTES NFR BLD AUTO: 0.3 % (ref 0–0.5)
INR PPP: 1.05 (ref 0.93–1.1)
KETONES UR QL STRIP: NEGATIVE
LEUKOCYTE ESTERASE UR QL STRIP.AUTO: NEGATIVE
LYMPHOCYTES # BLD AUTO: 0.8 10*3/MM3 (ref 0.7–3.1)
LYMPHOCYTES NFR BLD AUTO: 21.1 % (ref 19.6–45.3)
MCH RBC QN AUTO: 33.7 PG (ref 26.6–33)
MCHC RBC AUTO-ENTMCNC: 33.3 G/DL (ref 31.5–35.7)
MCV RBC AUTO: 101.1 FL (ref 79–97)
MONOCYTES # BLD AUTO: 0.24 10*3/MM3 (ref 0.1–0.9)
MONOCYTES NFR BLD AUTO: 6.3 % (ref 5–12)
NEUTROPHILS NFR BLD AUTO: 2.62 10*3/MM3 (ref 1.7–7)
NEUTROPHILS NFR BLD AUTO: 69.1 % (ref 42.7–76)
NITRITE UR QL STRIP: NEGATIVE
NRBC BLD AUTO-RTO: 0 /100 WBC (ref 0–0.2)
PH UR STRIP.AUTO: 6 [PH] (ref 5–8)
PLATELET # BLD AUTO: 408 10*3/MM3 (ref 140–450)
PMV BLD AUTO: 8.7 FL (ref 6–12)
PROT UR QL STRIP: NEGATIVE
PROTHROMBIN TIME: 11.2 SECONDS (ref 9.6–11.7)
RBC # BLD AUTO: 3.8 10*6/MM3 (ref 3.77–5.28)
SP GR UR STRIP: 1.02 (ref 1–1.03)
UROBILINOGEN UR QL STRIP: ABNORMAL
WBC NRBC COR # BLD: 3.79 10*3/MM3 (ref 3.4–10.8)

## 2023-05-01 PROCEDURE — 85730 THROMBOPLASTIN TIME PARTIAL: CPT

## 2023-05-01 PROCEDURE — 93005 ELECTROCARDIOGRAM TRACING: CPT | Performed by: ORTHOPAEDIC SURGERY

## 2023-05-01 PROCEDURE — 71046 X-RAY EXAM CHEST 2 VIEWS: CPT

## 2023-05-01 PROCEDURE — 81003 URINALYSIS AUTO W/O SCOPE: CPT

## 2023-05-01 PROCEDURE — 85610 PROTHROMBIN TIME: CPT

## 2023-05-01 PROCEDURE — 85025 COMPLETE CBC W/AUTO DIFF WBC: CPT

## 2023-05-01 PROCEDURE — 36415 COLL VENOUS BLD VENIPUNCTURE: CPT

## 2023-05-03 LAB — QT INTERVAL: 385 MS

## 2023-05-10 ENCOUNTER — TELEPHONE (OUTPATIENT)
Dept: FAMILY MEDICINE CLINIC | Facility: CLINIC | Age: 68
End: 2023-05-10
Payer: MEDICARE

## 2023-05-10 NOTE — TELEPHONE ENCOUNTER
Caller: JER BAUM    Relationship: Other    Best call back number: [phone]    What form or medical record are you requesting: RECENT LABS FROM 4/24/23 AND 5/1/23    Who is requesting this form or medical record from you: DR GAYLE'S OFFICE    How would you like to receive the form or medical records (pick-up, mail, fax): FAX  If fax, what is the fax number: [phone]  SURGERY CENTER: [phone]    Timeframe paperwork needed: ASAP    Additional notes: [facility]TH[facility]EDIC SUR[facility]ERY CALLED STATING PATIENT REPORTED SHE HAD BEEN RECENTLY DIAGNOSED WITH DIABETES AND THEY ARE REQUESTING LAB RESULTS TO BE FAXED TO THEM AND THE SURGERY CENTER.    PLEASE ADVISE

## 2023-05-11 ENCOUNTER — TELEPHONE (OUTPATIENT)
Dept: FAMILY MEDICINE CLINIC | Facility: CLINIC | Age: 68
End: 2023-05-11

## 2023-05-11 PROBLEM — E11.65 TYPE 2 DIABETES MELLITUS WITH HYPERGLYCEMIA: Status: ACTIVE | Noted: 2023-05-11

## 2023-05-11 NOTE — TELEPHONE ENCOUNTER
Pharmacy Name: [id number] - [place], [place] - [address] - [phone]  - [phone]      Pharmacy representative name: DARI    Pharmacy representative phone number: [phone]    What medication are you calling in regards to: GLUCOSE TEST STRIPS    What question does the pharmacy have: INSTRUCTIONS    Who is the provider that prescribed the medication: DR. YADAV    Additional notes: PHARMACY RECEIVED FAXED PRESCRIPTION FOR DIABETIC SUPPLIES BUT THEY NEED A NEW PRESCRIPTIONS SENT FOR THE TEST STRIPS WITH EXACT DIRECTIONS ON THE PRESCRIPTION    PLEASE ADVISE

## 2023-05-15 ENCOUNTER — LAB (OUTPATIENT)
Dept: LAB | Facility: HOSPITAL | Age: 68
End: 2023-05-15
Payer: MEDICARE

## 2023-05-15 ENCOUNTER — TRANSCRIBE ORDERS (OUTPATIENT)
Dept: ADMINISTRATIVE | Facility: HOSPITAL | Age: 68
End: 2023-05-15
Payer: MEDICARE

## 2023-05-15 DIAGNOSIS — M17.11 UNILATERAL PRIMARY OSTEOARTHRITIS, RIGHT KNEE: Primary | ICD-10-CM

## 2023-05-15 DIAGNOSIS — M17.11 UNILATERAL PRIMARY OSTEOARTHRITIS, RIGHT KNEE: ICD-10-CM

## 2023-05-15 LAB
ANION GAP SERPL CALCULATED.3IONS-SCNC: 11 MMOL/L (ref 5–15)
BUN SERPL-MCNC: 16 MG/DL (ref 8–23)
BUN/CREAT SERPL: 17.6 (ref 7–25)
CALCIUM SPEC-SCNC: 9.3 MG/DL (ref 8.6–10.5)
CHLORIDE SERPL-SCNC: 105 MMOL/L (ref 98–107)
CO2 SERPL-SCNC: 23 MMOL/L (ref 22–29)
CREAT SERPL-MCNC: 0.91 MG/DL (ref 0.57–1)
EGFRCR SERPLBLD CKD-EPI 2021: 69.3 ML/MIN/1.73
GLUCOSE SERPL-MCNC: 139 MG/DL (ref 65–99)
HBA1C MFR BLD: 6.3 % (ref 4.8–5.6)
POTASSIUM SERPL-SCNC: 4.5 MMOL/L (ref 3.5–5.2)
SODIUM SERPL-SCNC: 139 MMOL/L (ref 136–145)

## 2023-05-15 PROCEDURE — 83036 HEMOGLOBIN GLYCOSYLATED A1C: CPT

## 2023-05-15 PROCEDURE — 36415 COLL VENOUS BLD VENIPUNCTURE: CPT

## 2023-05-15 PROCEDURE — 80048 BASIC METABOLIC PNL TOTAL CA: CPT

## 2023-05-27 ENCOUNTER — TELEPHONE (OUTPATIENT)
Dept: FAMILY MEDICINE CLINIC | Facility: CLINIC | Age: 68
End: 2023-05-27
Payer: MEDICARE

## 2023-05-27 DIAGNOSIS — G47.00 INSOMNIA, UNSPECIFIED TYPE: Primary | ICD-10-CM

## 2023-05-27 RX ORDER — TRAZODONE HYDROCHLORIDE 50 MG/1
50 TABLET ORAL NIGHTLY
Qty: 30 TABLET | Refills: 1 | Status: SHIPPED | OUTPATIENT
Start: 2023-05-27

## 2023-05-28 NOTE — TELEPHONE ENCOUNTER
Patient requested medication for insomnia.  Had asked patient to make an appointment so we can discuss what she is tried in the past to hopefully point point better treatment.  Patient says that she cannot make an appointment at this time and is requesting a sleep aid.  Sending in trazodone 50 mg nightly, 30 tablets with 1 refill.  She will need to make an appointment in the next month or so to discuss efficacy or other options.

## 2023-05-30 ENCOUNTER — TELEPHONE (OUTPATIENT)
Dept: FAMILY MEDICINE CLINIC | Facility: CLINIC | Age: 68
End: 2023-05-30

## 2023-05-30 NOTE — TELEPHONE ENCOUNTER
----- Message from Celi Ordaz sent at 5/30/2023  1:15 PM EDT -----  Regarding: Sleeping pill refill   Contact: [phone]  Thank you Dr Berry for being a little flexible to adjust to my situation. A telephone visit will be fine. I just had my last Visiting Nurse/in home rehab visit today. I start with rehab tomorrow. Don’t know how long that will last. Thank you!

## 2023-05-30 NOTE — TELEPHONE ENCOUNTER
----- Message from Celi Ordaz sent at 5/30/2023  1:15 PM EDT -----  Regarding: Sleeping pill refill   Contact: [phone]  Thank you Dr Berry for being a little flexible to adjust to my situation. A telephone visit will be fine. I just had my last Visiting Nurse/in home rehab visit today. I start with rehab tomorrow. Don’t know how long that will last. Thank you!

## 2023-05-31 ENCOUNTER — TREATMENT (OUTPATIENT)
Dept: PHYSICAL THERAPY | Facility: CLINIC | Age: 68
End: 2023-05-31

## 2023-05-31 DIAGNOSIS — R26.9 GAIT DISTURBANCE: ICD-10-CM

## 2023-05-31 DIAGNOSIS — Z96.651 HISTORY OF TOTAL RIGHT KNEE REPLACEMENT: ICD-10-CM

## 2023-05-31 DIAGNOSIS — M25.561 ACUTE PAIN OF RIGHT KNEE: Primary | ICD-10-CM

## 2023-05-31 PROCEDURE — 97110 THERAPEUTIC EXERCISES: CPT | Performed by: PHYSICAL THERAPIST

## 2023-05-31 PROCEDURE — 97162 PT EVAL MOD COMPLEX 30 MIN: CPT | Performed by: PHYSICAL THERAPIST

## 2023-05-31 NOTE — PROGRESS NOTES
Physical Therapy Initial Evaluation and Plan of Care    Patient: Celi Ordaz   : 1955  Diagnosis/ICD-10 Code:  Acute pain of right knee [M25.561]  Referring practitioner: August Villanueva,*  Date of initial visit : 2023  Today's Date: 2023  Patient seen for 1  session    Visit Diagnoses:    ICD-10-CM ICD-9-CM   1. Acute pain of right knee  M25.561 719.46   2. Gait disturbance  R26.9 781.2   3. History of total right knee replacement  Z96.651 V43.65        Subjective Evaluation    History of Present Illness  Date of surgery: 2023  Mechanism of injury: Patient is a 68 year old female who presents status post right total knee replacement on 23.  Reports long history of right knee pain with prior meniscal tears and surgery.  Medical history of breast and kidney cancer and diabetes - see MR for full history. Had home health and a continuing with ambulation using rolling walker.  Reports R knee was very stiff prior to surgery.  Goals for pain free daily activity including returning to walking her dog.     HR: 81  O2 saturation: 98 %  BP: 116/78 mmHG    Subjective comment: Knee Outcome Survey - 38%  Patient Occupation: Retired Quality of life: good    Pain  Current pain ratin  At best pain rating: 3  At worst pain ratin  Pain location: Right knee.  Quality: dull ache  Relieving factors: medications, ice and change in position  Aggravating factors: prolonged positioning and ambulation  Progression: improved    Social Support  Lives with: spouse    Treatments  Previous treatment: physical therapy (Surgery)  Current treatment: physical therapy  Discharged from (in last 30 days): home health care  Patient Goals  Patient goals for therapy: decreased edema, decreased pain, improved balance, increased motion, increased strength, independence with ADLs/IADLs and return to sport/leisure activities             Objective          Active Range of Motion     Right Knee   Flexion: 88 degrees    Extensor la degrees     Patellar Mobility     Right Knee Hypomobile in the medial, lateral, superior and inferior patellar tendon(s).     Strength/Myotome Testing     Right Knee   Flexion: 4-  Extension: 2+  Quadriceps contraction: fair    Additional Strength Details  Incision healing well - covered in steri strips - no evidence of infection - R knee    Ambulation     Comments   Ambulates with rolling walker - decreased stance time/heel strike/step length with R LE  Sit to stand - SBA    Functional Assessment     Comments  TUG - 25.5 seconds with rolling walker          Assessment & Plan     Assessment  Impairments: abnormal gait, abnormal or restricted ROM, activity intolerance, impaired balance, impaired physical strength, lacks appropriate home exercise program and pain with function  Functional Limitations: carrying objects, walking and standing  Assessment details: Presents with limits in R knee AROM, strength, balance, altered gait with reliance on assistive device, knee pain, decreased PF mobility and activity limits per the above including walking, squatting, stairs status post right total knee replacement on 23.  She would benefit from skilled PT to address the above and restore to prior level of function safely and with decreased pain.   Prognosis: good    Goals  Plan Goals: ST. R knee AROM supine 10 degree lag or less over 2 weeks.   2. R knee seated FL AROM improved to 105 degrees or greater over 2 weeks.   3. Independent and compliant with HEP over 3 weeks.   4. Gait mechanics improved over 2 weeks with improved heel strike an stance time on R LE.   5. R knee pain decreased 25% or greater over 3 weeks with ADL's.     LT. Knee Outcome Survey improved to 65% or greater over 10 weeks.   2. Good gait mechanics without use of assistive device over 8 weeks.   3. R knee AROM improved to 0-115 degrees or greater over 10 weeks.   4. TUG time improved to 15 seconds or less over 10 weeks.    5. FTSST time improved to 15 second or less over 10 weeks.     Plan  Therapy options: will be seen for skilled therapy services  Planned modality interventions: cryotherapy, electrical stimulation/Russian stimulation, TENS, low level laser therapy and thermotherapy (hydrocollator packs)  Planned therapy interventions: manual therapy, ADL retraining, neuromuscular re-education, balance/weight-bearing training, compression, soft tissue mobilization, flexibility, functional ROM exercises, strengthening, stretching, therapeutic activities, gait training, home exercise program, transfer training and joint mobilization  Frequency: 2x week  Duration in weeks: 10  Treatment plan discussed with: patient        History # of Personal Factors and/or Comorbidities: MODERATE (1-2)  Examination of Body System(s): # of elements: MODERATE (3)  Clinical Presentation: EVOLVING  Clinical Decision Making: MODERATE       Timed:         Manual Therapy:    2     mins  06049;     Therapeutic Exercise:    18     mins  50156;         Un-Timed:  Mod Eval     30     Mins  42030      Timed Treatment:   20   mins   Total Treatment:     50   mins          PT SIGNATURE: Mark Jauregui PT, DPT   [place] Lic #[id number]    DATE TREATMENT INITIATED: 5/31/2023    Medicare Initial Certification  Certification Period: 8/29/2023  I certify that the therapy services are furnished while this patient is under my care.  The services outlined above are required by this patient, and will be reviewed every 90 days.     PHYSICIAN: August Villanueva MD      DATE:     Please sign and return via fax to [phone].. Thank you, [facility].

## 2023-06-02 ENCOUNTER — TREATMENT (OUTPATIENT)
Dept: PHYSICAL THERAPY | Facility: CLINIC | Age: 68
End: 2023-06-02
Payer: MEDICARE

## 2023-06-02 DIAGNOSIS — Z96.651 HISTORY OF TOTAL RIGHT KNEE REPLACEMENT: ICD-10-CM

## 2023-06-02 DIAGNOSIS — R26.9 GAIT DISTURBANCE: ICD-10-CM

## 2023-06-02 DIAGNOSIS — M25.561 ACUTE PAIN OF RIGHT KNEE: Primary | ICD-10-CM

## 2023-06-02 NOTE — PROGRESS NOTES
Physical Therapy Daily Treatment Note  [facility]   [address] [facility] [place] [address]    VISIT#: 2    Subjective   Celi Ordaz reports stiffness, most severe in the morning. Unable to sleep in bed yet, sleeping in recliner.   Pain Ratin    Objective     See Exercise, Manual, and Modality Logs for complete treatment.     Patient Education: HEP, exercise rationale    Assessment/Plan  Pt and PTA discussed weaning off the rwx and to cane. Advised pt to continue use of walker at this time. Educated pt on safety and sequencing with cane, but held activity until later visit due to knee pain and fatigue. Pt tolerated exercises well with some pain noted. Plan to progress next visit as able.     Progress per Plan of Care and Progress strengthening /stabilization /functional activity          Timed:         Manual Therapy:    13     mins  47329;     Therapeutic Exercise:    12     mins  89616;     Neuromuscular Cuong:        mins  63071;    Therapeutic Activity:     14     mins  61624;     Gait Training:           mins  71842;     Ultrasound:          mins  18506;    Ionto                                   mins   70080  Self Care                            mins   95699  Canalith Repos                   mins  75701    Un-Timed:  Electrical Stimulation:         mins  65033 ( );  Dry Needling          mins self-pay  Traction          mins 67317  Low Eval          Mins  79220  Mod Eval          Mins  43675  High Eval                            Mins  20510  Re-Eval                               mins  22527    Timed Treatment:   39   mins   Total Treatment:     39   mins          Monica Singh  Physical Therapist Assistant  [place] License # [id number]

## 2023-06-07 ENCOUNTER — TREATMENT (OUTPATIENT)
Dept: PHYSICAL THERAPY | Facility: CLINIC | Age: 68
End: 2023-06-07
Payer: MEDICARE

## 2023-06-07 DIAGNOSIS — R26.9 GAIT DISTURBANCE: ICD-10-CM

## 2023-06-07 DIAGNOSIS — Z96.651 HISTORY OF TOTAL RIGHT KNEE REPLACEMENT: ICD-10-CM

## 2023-06-07 DIAGNOSIS — M25.561 ACUTE PAIN OF RIGHT KNEE: Primary | ICD-10-CM

## 2023-06-07 PROCEDURE — 97110 THERAPEUTIC EXERCISES: CPT | Performed by: PHYSICAL THERAPIST

## 2023-06-07 PROCEDURE — 97530 THERAPEUTIC ACTIVITIES: CPT | Performed by: PHYSICAL THERAPIST

## 2023-06-07 NOTE — PROGRESS NOTES
Physical Therapy Daily Treatment Note  Visit: 2    Celi Ordaz reports: returned to MD an put on antibiotic per possible infection.  Reports also instructed to use walker at this time per MD.   YOB: 1955  Referring practitioner : August Villanueva,*  Date of Initial: Type: THERAPY  Noted: 5/31/2023  Today's date: 6/7/2023  Patient seen for 2 sessions    Visit Diagnoses:    ICD-10-CM ICD-9-CM   1. Acute pain of right knee  M25.561 719.46   2. Gait disturbance  R26.9 781.2   3. History of total right knee replacement  Z96.651 V43.65       Subjective       Objective     R knee AROM 15-90 post visit.   See Exercise, Manual, and Modality Logs for complete treatment.       Assessment/Plan    Still limited per pain swelling in R knee.  Improved post tx and education on and reviewed need for frequent but gentle ROM activity with home program.    Plan:    Continue current           Timed:         Manual Therapy:    5     mins  91360;     Therapeutic Exercise:    15     mins  48026;     Therapeutic Activity:     9     mins  61534;           Timed Treatment:   29   mins   Total Treatment:     29   mins         Mark Jauregui, PT, DPT  Physical Therapist  [place] Lic #[id number]

## 2023-06-09 ENCOUNTER — TREATMENT (OUTPATIENT)
Dept: PHYSICAL THERAPY | Facility: CLINIC | Age: 68
End: 2023-06-09
Payer: MEDICARE

## 2023-06-09 NOTE — PROGRESS NOTES
Physical Therapy Daily Progress Note      Patient: Celi Ordaz   : 1955  Diagnosis/ICD-10 Code:  No primary diagnosis found.  Referring practitioner: August Villanueva,*  Date of Initial Visit: No linked episodes  Today's Date: 2023  Patient seen for Visit count could not be calculated. Make sure you are using a visit which is associated with an episode. sessions             Subjective No significant changes to report today.  Continues to have pain in her right lateral calf    Objective   See Exercise, Manual, and Modality Logs for complete treatment.       Assessment/Plan  Functional ROM, strength and gait deficits still persist.  Progress all as tolerated.    Progress per Plan of Care           Timed:         Manual Therapy:    5     mins  48037;     Therapeutic Exercise:    15     mins  16893;     Therapeutic Activity:     10     mins  13696;         Timed Treatment:   30   mins   Total Treatment:     30   mins        Bishop Sterling PTA  Physical Therapist Assistant

## 2023-06-14 ENCOUNTER — TREATMENT (OUTPATIENT)
Dept: PHYSICAL THERAPY | Facility: CLINIC | Age: 68
End: 2023-06-14
Payer: MEDICARE

## 2023-06-14 DIAGNOSIS — R26.9 GAIT DISTURBANCE: ICD-10-CM

## 2023-06-14 DIAGNOSIS — Z96.651 HISTORY OF TOTAL RIGHT KNEE REPLACEMENT: ICD-10-CM

## 2023-06-14 DIAGNOSIS — M25.561 ACUTE PAIN OF RIGHT KNEE: Primary | ICD-10-CM

## 2023-06-14 PROCEDURE — 97110 THERAPEUTIC EXERCISES: CPT | Performed by: PHYSICAL THERAPIST

## 2023-06-14 PROCEDURE — 97530 THERAPEUTIC ACTIVITIES: CPT | Performed by: PHYSICAL THERAPIST

## 2023-06-14 NOTE — PROGRESS NOTES
Physical Therapy Daily Treatment Note  Visit: 3    Celi Ordaz reports: still stiff in AM but better as she gets up an moves.   YOB: 1955  Referring practitioner : August Villanueva,*  Date of Initial: Type: THERAPY  Noted: 5/31/2023  Today's date: 6/14/2023  Patient seen for 3 sessions    Visit Diagnoses:    ICD-10-CM ICD-9-CM   1. Acute pain of right knee  M25.561 719.46   2. Gait disturbance  R26.9 781.2   3. History of total right knee replacement  Z96.651 V43.65       Subjective       Objective   See Exercise, Manual, and Modality Logs for complete treatment.       Assessment/Plan    Improved AROM both EXT/flexion of R knee along with better gait mechanics.  Good understanding of HEP.     Plan:    Continue current           Timed:         Manual Therapy:    3     mins  16842;     Therapeutic Exercise:    30     mins  09619;      Therapeutic Activity:     8     mins  10682;           Timed Treatment:   41   mins   Total Treatment:     41   mins         Mark Jauregui PT, DPT  Physical Therapist  [place] Lic #[id number]

## 2023-06-16 ENCOUNTER — TREATMENT (OUTPATIENT)
Dept: PHYSICAL THERAPY | Facility: CLINIC | Age: 68
End: 2023-06-16
Payer: MEDICARE

## 2023-06-16 DIAGNOSIS — M25.561 ACUTE PAIN OF RIGHT KNEE: Primary | ICD-10-CM

## 2023-06-16 DIAGNOSIS — R26.9 GAIT DISTURBANCE: ICD-10-CM

## 2023-06-16 DIAGNOSIS — Z96.651 HISTORY OF TOTAL RIGHT KNEE REPLACEMENT: ICD-10-CM

## 2023-06-16 PROCEDURE — 97110 THERAPEUTIC EXERCISES: CPT | Performed by: PHYSICAL THERAPIST

## 2023-06-16 PROCEDURE — 97140 MANUAL THERAPY 1/> REGIONS: CPT | Performed by: PHYSICAL THERAPIST

## 2023-06-16 PROCEDURE — 97112 NEUROMUSCULAR REEDUCATION: CPT | Performed by: PHYSICAL THERAPIST

## 2023-06-16 NOTE — PROGRESS NOTES
Physical Therapy Daily Treatment Note  Visit: 4    Celi Ordaz reports: no new issues today.   YOB: 1955  Referring practitioner : August Villanueva,*  Date of Initial: Type: THERAPY  Noted: 5/31/2023  Today's date: 6/16/2023  Patient seen for 4 sessions    Visit Diagnoses:    ICD-10-CM ICD-9-CM   1. Acute pain of right knee  M25.561 719.46   2. Gait disturbance  R26.9 781.2   3. History of total right knee replacement  Z96.651 V43.65       Subjective       Objective   1. R knee AROM supine 10 degree lag or less over 2 weeks. - Progressed towards  2. R knee seated FL AROM improved to 105 degrees or greater over 2 weeks. - Met  3. Independent and compliant with HEP over 3 weeks. - Met  4. Gait mechanics improved over 2 weeks with improved heel strike an stance time on R LE. - Met  5. R knee pain decreased 25% or greater over 3 weeks with ADL's. Met      See Exercise, Manual, and Modality Logs for complete treatment.       Assessment/Plan    Good progress with improved R knee ROM/strength and gait mechanics.   Good compliance with HEP.     Plan:    Continue current           Timed:         Manual Therapy:    9     mins  45178;     Therapeutic Exercise:    21     mins  47476;     Neuromuscular Cuong:    10    mins  42818;        Timed Treatment:   45   mins   Total Treatment:     45   mins         Mark Jauregui PT, DPT  Physical Therapist  [place] Lic #[id number]

## 2023-07-24 ENCOUNTER — TELEPHONE (OUTPATIENT)
Dept: FAMILY MEDICINE CLINIC | Facility: CLINIC | Age: 68
End: 2023-07-24
Payer: MEDICARE

## 2023-07-24 DIAGNOSIS — E78.2 MIXED HYPERLIPIDEMIA: ICD-10-CM

## 2023-07-24 DIAGNOSIS — T88.7XXA DRUG SIDE EFFECTS: Primary | ICD-10-CM

## 2023-07-24 NOTE — TELEPHONE ENCOUNTER
Spoke to pt 07/24/2023, 5:48 pm advised pt per Dr. Berry it is time to repeat her labs, pleas have them done at her convenience

## 2023-07-24 NOTE — TELEPHONE ENCOUNTER
Patient had an elevated lipid panel on 4/24/2023 with an ASCVD risk of 11.6%.  Started atorvastatin.      Also started metformin 500 mg for hemoglobin A1c of 6.5% on this date.  Patient include hemoglobin A1c was checked 1 month after this by another provider and found to be at 6.3%.  We will hold off rechecking A1c for now    Sending in labs to recheck cholesterol and CMP

## 2023-07-25 ENCOUNTER — TREATMENT (OUTPATIENT)
Dept: PHYSICAL THERAPY | Facility: CLINIC | Age: 68
End: 2023-07-25
Payer: MEDICARE

## 2023-07-25 DIAGNOSIS — Z96.651 HISTORY OF TOTAL RIGHT KNEE REPLACEMENT: ICD-10-CM

## 2023-07-25 DIAGNOSIS — M25.561 ACUTE PAIN OF RIGHT KNEE: Primary | ICD-10-CM

## 2023-07-25 DIAGNOSIS — R26.9 GAIT DISTURBANCE: ICD-10-CM

## 2023-07-25 PROCEDURE — 97530 THERAPEUTIC ACTIVITIES: CPT | Performed by: PHYSICAL THERAPIST

## 2023-07-25 PROCEDURE — 97110 THERAPEUTIC EXERCISES: CPT | Performed by: PHYSICAL THERAPIST

## 2023-07-25 PROCEDURE — 97140 MANUAL THERAPY 1/> REGIONS: CPT | Performed by: PHYSICAL THERAPIST

## 2023-07-25 NOTE — PROGRESS NOTES
Physical Therapy Daily Treatment Note  Visit: 14    Celi Ordaz reports: area on R knee incision is red again where prior issue was.   YOB: 1955  Referring practitioner : August Villanueva,*  Date of Initial: Type: THERAPY  Noted: 5/31/2023  Today's date: 7/25/2023  Patient seen for 14 sessions    Visit Diagnoses:    ICD-10-CM ICD-9-CM   1. Acute pain of right knee  M25.561 719.46   2. History of total right knee replacement  Z96.651 V43.65   3. Gait disturbance  R26.9 781.2       Subjective       Objective     TUG 8.02 seconds  FTSST 11.28 seconds  Single leg balance R 3 seconds L 0   AROM R knee 15-95 beginning, knee EXT 12 degrees end of session  See Exercise, Manual, and Modality Logs for complete treatment.       Assessment/Plan    Advised to call MD about redness distally on incision per concern of infection.  Progressing well otherwise with improving functional strength and ROM main limit at this time.  Reviewed HEP and activities to improve both FL/EXT ROM of R knee.     Plan:    Continue current           Timed:         Manual Therapy:    12     mins  83502;     Therapeutic Exercise:    18     mins  37163;       Therapeutic Activity:     10     mins  85048;           Timed Treatment:   40   mins   Total Treatment:     40   mins         Mark Jauregui PT, DPT  Physical Therapist  [place] Lic #[id number]

## 2023-07-28 ENCOUNTER — TREATMENT (OUTPATIENT)
Dept: PHYSICAL THERAPY | Facility: CLINIC | Age: 68
End: 2023-07-28
Payer: MEDICARE

## 2023-07-28 DIAGNOSIS — M25.561 ACUTE PAIN OF RIGHT KNEE: Primary | ICD-10-CM

## 2023-07-28 DIAGNOSIS — Z96.651 HISTORY OF TOTAL RIGHT KNEE REPLACEMENT: ICD-10-CM

## 2023-07-28 DIAGNOSIS — R26.9 GAIT DISTURBANCE: ICD-10-CM

## 2023-07-28 NOTE — PROGRESS NOTES
Physical Therapy Daily Treatment Note  Visit: 15    Celi Ordaz reports: called MD and to see next week about place on incision.  Reports looks better and had slight drainage but recently.   YOB: 1955  Referring practitioner : August Villanueva,*  Date of Initial: Type: THERAPY  Noted: 5/31/2023  Today's date: 7/28/2023  Patient seen for 15 sessions    Visit Diagnoses:    ICD-10-CM ICD-9-CM   1. Acute pain of right knee  M25.561 719.46   2. Gait disturbance  R26.9 781.2   3. History of total right knee replacement  Z96.651 V43.65       Subjective       Objective   AROM R knee  degreees, 6-116 degrees end of visit  See Exercise, Manual, and Modality Logs for complete treatment.       Assessment/Plan    Improved R knee AROM both FL/EXT and strength today.  Good HEP compliance.     Plan:    Continue x 1 per week.          Timed:         Manual Therapy:    11     mins  75950;     Therapeutic Exercise:    25     mins  39525;     Therapeutic Activity:     9     mins  76490;           Timed Treatment:   45   mins   Total Treatment:     45   mins         Mark Jauregui PT, DPT  Physical Therapist  [place] Lic #[id number]

## 2023-07-31 ENCOUNTER — TREATMENT (OUTPATIENT)
Dept: PHYSICAL THERAPY | Facility: CLINIC | Age: 68
End: 2023-07-31
Payer: MEDICARE

## 2023-07-31 DIAGNOSIS — M25.561 ACUTE PAIN OF RIGHT KNEE: Primary | ICD-10-CM

## 2023-07-31 DIAGNOSIS — Z96.651 HISTORY OF TOTAL RIGHT KNEE REPLACEMENT: ICD-10-CM

## 2023-07-31 DIAGNOSIS — R26.9 GAIT DISTURBANCE: ICD-10-CM

## 2023-07-31 PROCEDURE — 97110 THERAPEUTIC EXERCISES: CPT | Performed by: PHYSICAL THERAPIST

## 2023-07-31 PROCEDURE — 97140 MANUAL THERAPY 1/> REGIONS: CPT | Performed by: PHYSICAL THERAPIST

## 2023-07-31 PROCEDURE — 97530 THERAPEUTIC ACTIVITIES: CPT | Performed by: PHYSICAL THERAPIST

## 2023-08-09 ENCOUNTER — TREATMENT (OUTPATIENT)
Dept: PHYSICAL THERAPY | Facility: CLINIC | Age: 68
End: 2023-08-09
Payer: MEDICARE

## 2023-08-09 DIAGNOSIS — R26.9 GAIT DISTURBANCE: ICD-10-CM

## 2023-08-09 DIAGNOSIS — M25.561 ACUTE PAIN OF RIGHT KNEE: Primary | ICD-10-CM

## 2023-08-09 DIAGNOSIS — Z96.651 HISTORY OF TOTAL RIGHT KNEE REPLACEMENT: ICD-10-CM

## 2023-08-09 PROCEDURE — 97530 THERAPEUTIC ACTIVITIES: CPT | Performed by: PHYSICAL THERAPIST

## 2023-08-09 PROCEDURE — 97110 THERAPEUTIC EXERCISES: CPT | Performed by: PHYSICAL THERAPIST

## 2023-08-09 NOTE — PROGRESS NOTES
Physical Therapy Daily Progress Note      Patient: Celi Ordaz   : 1955  Diagnosis/ICD-10 Code:  Acute pain of right knee [M25.561]  Referring practitioner: August Villanueav,*  Date of Initial Visit: Type: THERAPY  Noted: 2023  Today's Date: 2023  Patient seen for 17 sessions             Subjective Nothing new to report    Objective   See Exercise, Manual, and Modality Logs for complete treatment.       Assessment/Plan  Pt had c/o pain with PROM.  Exercises tolerated fairly well    Progress per Plan of Care           Timed:         Manual Therapy:    6     mins  98774;     Therapeutic Exercise:    20     mins  22884;     Therapeutic Activity:     9     mins  28301;         Timed Treatment:   35   mins   Total Treatment:     35   mins        Bishop Sterling PTA  Physical Therapist Assistant

## 2023-08-15 ENCOUNTER — TRANSCRIBE ORDERS (OUTPATIENT)
Dept: ADMINISTRATIVE | Facility: HOSPITAL | Age: 68
End: 2023-08-15
Payer: MEDICARE

## 2023-08-15 DIAGNOSIS — Z12.31 BREAST CANCER SCREENING BY MAMMOGRAM: Primary | ICD-10-CM

## 2023-08-16 ENCOUNTER — TREATMENT (OUTPATIENT)
Dept: PHYSICAL THERAPY | Facility: CLINIC | Age: 68
End: 2023-08-16
Payer: MEDICARE

## 2023-08-16 DIAGNOSIS — Z96.651 HISTORY OF TOTAL RIGHT KNEE REPLACEMENT: ICD-10-CM

## 2023-08-16 DIAGNOSIS — R26.9 GAIT DISTURBANCE: ICD-10-CM

## 2023-08-16 DIAGNOSIS — M25.561 ACUTE PAIN OF RIGHT KNEE: Primary | ICD-10-CM

## 2023-08-16 NOTE — PROGRESS NOTES
Physical Therapy Daily Treatment Note  Visit: 18    Celi Ordaz reports: frustrated with continued stiffness and pain.  Reports feels her gait is slightly worse now than earlier in rehab. Reports is not using her cane at this time.   YOB: 1955  Referring practitioner : August Villanueva,*  Date of Initial: Type: THERAPY  Noted: 5/31/2023  Today's date: 8/16/2023  Patient seen for 18 sessions    Visit Diagnoses:    ICD-10-CM ICD-9-CM   1. Acute pain of right knee  M25.561 719.46   2. History of total right knee replacement  Z96.651 V43.65   3. Gait disturbance  R26.9 781.2       Subjective       Objective   R knee AROM EXT 15 degrees beginning of visit - 3 degree lag end of visit  See Exercise, Manual, and Modality Logs for complete treatment.       Assessment/Plan    Continued but improved limits in R knee AROM, gait and activity tolerance.  Good improvement in R knee EXT with  cross connect activity.  Still limited tolerance to PROM per pain.  Educated may benefit from using cane per pain and gait quality in short term.     Plan:    Continue current         Timed:         Manual Therapy:    9     mins  86220;     Therapeutic Exercise:    23     mins  47682;     Therapeutic Activity:     8     mins  30430;         Timed Treatment:   40   mins   Total Treatment:     40   mins         Mark Jauregui PT, DPT  Physical Therapist  [place] Lic #[id number]

## 2023-08-23 ENCOUNTER — TREATMENT (OUTPATIENT)
Dept: PHYSICAL THERAPY | Facility: CLINIC | Age: 68
End: 2023-08-23
Payer: MEDICARE

## 2023-08-23 DIAGNOSIS — M25.561 ACUTE PAIN OF RIGHT KNEE: Primary | ICD-10-CM

## 2023-08-23 DIAGNOSIS — Z96.651 HISTORY OF TOTAL RIGHT KNEE REPLACEMENT: ICD-10-CM

## 2023-08-23 DIAGNOSIS — R26.9 GAIT DISTURBANCE: ICD-10-CM

## 2023-08-24 NOTE — PROGRESS NOTES
Physical Therapy Daily Treatment Note  Visit: 19    Celi Ordaz reports: no new issues today.  Reports continues with ROM exercise but still painful with reaching the end of her range or holding for time.   YOB: 1955  Referring practitioner : August Villanueva,*  Date of Initial: Type: THERAPY  Noted: 2023  Today's date: 2023  Patient seen for 19 sessions    Visit Diagnoses:    ICD-10-CM ICD-9-CM   1. Acute pain of right knee  M25.561 719.46   2. Gait disturbance  R26.9 781.2   3. History of total right knee replacement  Z96.651 V43.65       Subjective       Objective   R knee AROM 5-110 end of visit    LT. Knee Outcome Survey improved to 65% or greater over 10 weeks. - Met  2. Good gait mechanics without use of assistive device over 8 weeks. - Progressed towards  3. R knee AROM improved to 0-115 degrees or greater over 10 weeks. - Progressed towards  4. TUG time improved to 15 seconds or less over 10 weeks. - Met  5. FTSST time improved to 15 second or less over 10 weeks. - Progressed towards     See Exercise, Manual, and Modality Logs for complete treatment.       Assessment/Plan    ROM in R knee improving and gait mechanics better with decreased frontal plane compensation to R during stance. Reinforced need for frequent ROM activity at home and to limit walking is she begins to limp or get increased soreness in her R knee.     Plan:    Continue current           Timed:         Manual Therapy:    8     mins  36397;     Therapeutic Exercise:    24     mins  99121;     Therapeutic Activity:     10     mins  80396;         Timed Treatment:   42   mins   Total Treatment:     42   mins         Mark Jauregui PT, DPT  Physical Therapist  [place] Lic #[id number]

## 2023-08-30 ENCOUNTER — TREATMENT (OUTPATIENT)
Dept: PHYSICAL THERAPY | Facility: CLINIC | Age: 68
End: 2023-08-30
Payer: MEDICARE

## 2023-08-30 DIAGNOSIS — R26.9 GAIT DISTURBANCE: ICD-10-CM

## 2023-08-30 DIAGNOSIS — M25.561 ACUTE PAIN OF RIGHT KNEE: Primary | ICD-10-CM

## 2023-08-30 DIAGNOSIS — Z96.651 HISTORY OF TOTAL RIGHT KNEE REPLACEMENT: ICD-10-CM

## 2023-08-30 NOTE — PROGRESS NOTES
Physical Therapy Daily Treatment Note  Visit: 20    Celi Ordaz reports: no new issues today.   YOB: 1955  Referring practitioner : August Villanueva,*  Date of Initial: Type: THERAPY  Noted: 5/31/2023  Today's date: 8/30/2023  Patient seen for 20 sessions    Visit Diagnoses:    ICD-10-CM ICD-9-CM   1. Acute pain of right knee  M25.561 719.46   2. Gait disturbance  R26.9 781.2   3. History of total right knee replacement  Z96.651 V43.65       Subjective       Objective   1. Knee Outcome Survey improved to 65% or greater over 10 weeks. - Met  2. Good gait mechanics without use of assistive device over 20 weeks. - Progressed towards - Goal timeline changes  3. R knee AROM improved to 0-115 degrees or greater over 20 weeks. - Progressed towards - Goal timeline changed  4. TUG time improved to 15 seconds or less over 10 weeks. - Met  5. FTSST time improved to 15 second or less over 20 weeks. - Progressed towards - Goal timeline changed    R knee AROM  degrees beginning, 5-110 end of visit  Knee Outcome Survey 70%  See Exercise, Manual, and Modality Logs for complete treatment.       Assessment/Plan    ROM still biggest limits to function of R knee.  Good compliance to HEP.  Less pain with PROM and exercise but still limits knee EXT activities.   Limits in R knee ROM impact daily activities including standing for cooking, cleaning and ability for independence in community with walking and standing per pain.  More gradual progression of ROM needed throughout PT episode per higher levels of pain, co-morbidities with ongoing cancer treatment, intermittent issues with incision and limited tolerance to PROM and either low load long duration ROM or higher force with PROM to improve ROM.            Timed:         Manual Therapy:    9     mins  45741;     Therapeutic Exercise:    20     mins  34658;     Therapeutic Activity:     10     mins  16227;           Timed Treatment:   39   mins   Total  Treatment:     39   mins         Mark Jauregui, PT, DPT  Physical Therapist  [place] Lic #[id number]

## 2023-09-05 ENCOUNTER — HOSPITAL ENCOUNTER (OUTPATIENT)
Dept: MAMMOGRAPHY | Facility: HOSPITAL | Age: 68
Discharge: HOME | End: 2023-09-05
Admitting: INTERNAL MEDICINE
Payer: MEDICARE

## 2023-09-05 DIAGNOSIS — Z12.31 BREAST CANCER SCREENING BY MAMMOGRAM: ICD-10-CM

## 2023-09-05 PROCEDURE — 77067 SCR MAMMO BI INCL CAD: CPT

## 2023-09-05 PROCEDURE — 77063 BREAST TOMOSYNTHESIS BI: CPT

## 2023-09-14 ENCOUNTER — TELEPHONE (OUTPATIENT)
Dept: PHYSICAL THERAPY | Facility: CLINIC | Age: 68
End: 2023-09-14

## 2023-10-24 PROCEDURE — 88342 IMHCHEM/IMCYTCHM 1ST ANTB: CPT | Performed by: INTERNAL MEDICINE

## 2023-10-24 PROCEDURE — 88341 IMHCHEM/IMCYTCHM EA ADD ANTB: CPT | Performed by: INTERNAL MEDICINE

## 2023-10-24 PROCEDURE — 88305 TISSUE EXAM BY PATHOLOGIST: CPT | Performed by: INTERNAL MEDICINE

## 2023-10-24 PROCEDURE — 88360 TUMOR IMMUNOHISTOCHEM/MANUAL: CPT | Performed by: INTERNAL MEDICINE

## 2023-10-25 ENCOUNTER — LAB REQUISITION (OUTPATIENT)
Dept: LAB | Facility: HOSPITAL | Age: 68
End: 2023-10-25
Payer: MEDICARE

## 2023-10-25 DIAGNOSIS — Z86.010 PERSONAL HISTORY OF COLONIC POLYPS: ICD-10-CM

## 2023-10-26 LAB
LAB AP CASE REPORT: NORMAL
LAB AP DIAGNOSIS COMMENT: NORMAL
PATH REPORT.FINAL DX SPEC: NORMAL
PATH REPORT.GROSS SPEC: NORMAL

## 2023-11-02 DIAGNOSIS — E11.65 TYPE 2 DIABETES MELLITUS WITH HYPERGLYCEMIA, WITHOUT LONG-TERM CURRENT USE OF INSULIN: ICD-10-CM

## 2023-11-06 RX ORDER — METFORMIN HYDROCHLORIDE 500 MG/1
500 TABLET, EXTENDED RELEASE ORAL
Qty: 90 TABLET | Refills: 3 | Status: SHIPPED | OUTPATIENT
Start: 2023-11-06

## 2023-12-15 ENCOUNTER — TRANSCRIBE ORDERS (OUTPATIENT)
Dept: ADMINISTRATIVE | Facility: HOSPITAL | Age: 68
End: 2023-12-15
Payer: MEDICARE

## 2023-12-15 DIAGNOSIS — R06.02 SHORTNESS OF BREATH: ICD-10-CM

## 2023-12-15 DIAGNOSIS — C18.9 MALIGNANT NEOPLASM OF COLON, UNSPECIFIED PART OF COLON: Primary | ICD-10-CM

## 2023-12-20 DIAGNOSIS — E78.2 MIXED HYPERLIPIDEMIA: ICD-10-CM

## 2023-12-21 RX ORDER — ATORVASTATIN CALCIUM 10 MG/1
10 TABLET, FILM COATED ORAL DAILY
Qty: 90 TABLET | Refills: 1 | Status: SHIPPED | OUTPATIENT
Start: 2023-12-21

## 2023-12-21 NOTE — TELEPHONE ENCOUNTER
Refilled statin.    Patient needs an appointment on 1/9/2024 relator for annual wellness.  She is also overdue for an A1c check.  We can do this on the same date

## 2024-01-02 ENCOUNTER — HOSPITAL ENCOUNTER (OUTPATIENT)
Dept: NUCLEAR MEDICINE | Facility: HOSPITAL | Age: 69
Discharge: HOME | End: 2024-01-02
Payer: MEDICARE

## 2024-01-02 DIAGNOSIS — C18.9 MALIGNANT NEOPLASM OF COLON, UNSPECIFIED PART OF COLON: ICD-10-CM

## 2024-01-02 DIAGNOSIS — R06.02 SHORTNESS OF BREATH: ICD-10-CM

## 2024-01-02 LAB
BH CV NUCLEAR PRIOR STUDY: 2
BH CV REST NUCLEAR ISOTOPE DOSE: 11 MCI
BH CV STRESS BP STAGE 1: NORMAL
BH CV STRESS BP STAGE 3: NORMAL
BH CV STRESS BP STAGE 4: NORMAL
BH CV STRESS COMMENTS STAGE 1: NORMAL
BH CV STRESS DOSE REGADENOSON STAGE 1: 0.4
BH CV STRESS DURATION MIN STAGE 1: 1
BH CV STRESS DURATION MIN STAGE 2: 1
BH CV STRESS DURATION MIN STAGE 3: 1
BH CV STRESS DURATION MIN STAGE 4: 1
BH CV STRESS DURATION SEC STAGE 1: 10
BH CV STRESS DURATION SEC STAGE 2: 2
BH CV STRESS HR STAGE 1: 98
BH CV STRESS HR STAGE 2: 101
BH CV STRESS HR STAGE 3: 97
BH CV STRESS HR STAGE 4: 94
BH CV STRESS NUCLEAR ISOTOPE DOSE: 33 MCI
BH CV STRESS PROTOCOL 1: NORMAL
BH CV STRESS RECOVERY BP: NORMAL MMHG
BH CV STRESS RECOVERY HR: 90 BPM
BH CV STRESS STAGE 1: 1
BH CV STRESS STAGE 2: 2
BH CV STRESS STAGE 3: 3
BH CV STRESS STAGE 4: 4
LV EF NUC BP: 62 %
MAXIMAL PREDICTED HEART RATE: 153 BPM
PERCENT MAX PREDICTED HR: 66.01 %
STRESS BASELINE BP: NORMAL MMHG
STRESS BASELINE HR: 68 BPM
STRESS PERCENT HR: 78 %
STRESS POST PEAK BP: NORMAL MMHG
STRESS POST PEAK HR: 101 BPM
STRESS TARGET HR: 130 BPM

## 2024-01-02 PROCEDURE — 0 TECHNETIUM TETROFOSMIN KIT: Performed by: STUDENT IN AN ORGANIZED HEALTH CARE EDUCATION/TRAINING PROGRAM

## 2024-01-02 PROCEDURE — 25010000002 REGADENOSON 0.4 MG/5ML SOLUTION: Performed by: STUDENT IN AN ORGANIZED HEALTH CARE EDUCATION/TRAINING PROGRAM

## 2024-01-02 PROCEDURE — 78452 HT MUSCLE IMAGE SPECT MULT: CPT

## 2024-01-02 PROCEDURE — A9502 TC99M TETROFOSMIN: HCPCS | Performed by: STUDENT IN AN ORGANIZED HEALTH CARE EDUCATION/TRAINING PROGRAM

## 2024-01-02 PROCEDURE — 93017 CV STRESS TEST TRACING ONLY: CPT

## 2024-01-02 RX ORDER — REGADENOSON 0.08 MG/ML
0.4 INJECTION, SOLUTION INTRAVENOUS
Status: COMPLETED | OUTPATIENT
Start: 2024-01-02 | End: 2024-01-02

## 2024-01-02 RX ADMIN — REGADENOSON 0.4 MG: 0.08 INJECTION, SOLUTION INTRAVENOUS at 11:16

## 2024-01-02 RX ADMIN — TETROFOSMIN 1 DOSE: 1.38 INJECTION, POWDER, LYOPHILIZED, FOR SOLUTION INTRAVENOUS at 10:09

## 2024-01-02 RX ADMIN — TETROFOSMIN 1 DOSE: 1.38 INJECTION, POWDER, LYOPHILIZED, FOR SOLUTION INTRAVENOUS at 11:16

## 2024-01-17 LAB
LAB AP CASE REPORT: NORMAL
LAB AP CASE REPORT: NORMAL
LAB AP DIAGNOSIS COMMENT: NORMAL
LAB AP DIAGNOSIS COMMENT: NORMAL
Lab: NORMAL
PATH REPORT.ADDENDUM SPEC: NORMAL
PATH REPORT.ADDENDUM SPEC: NORMAL
PATH REPORT.FINAL DX SPEC: NORMAL
PATH REPORT.FINAL DX SPEC: NORMAL
PATH REPORT.GROSS SPEC: NORMAL
PATH REPORT.GROSS SPEC: NORMAL

## 2024-02-13 LAB — REF LAB TEST METHOD: NORMAL

## 2024-04-24 LAB
REF LAB TEST METHOD: NORMAL
REF LAB TEST METHOD: NORMAL

## 2024-04-29 LAB
LAB AP CASE REPORT: NORMAL
LAB AP DIAGNOSIS COMMENT: NORMAL
Lab: NORMAL
Lab: NORMAL
PATH REPORT.ADDENDUM SPEC: NORMAL
PATH REPORT.FINAL DX SPEC: NORMAL
PATH REPORT.GROSS SPEC: NORMAL

## 2024-06-21 DIAGNOSIS — E78.2 MIXED HYPERLIPIDEMIA: ICD-10-CM

## 2024-06-24 RX ORDER — ATORVASTATIN CALCIUM 10 MG/1
10 TABLET, FILM COATED ORAL DAILY
Qty: 90 TABLET | Refills: 1 | OUTPATIENT
Start: 2024-06-24

## 2024-06-24 NOTE — TELEPHONE ENCOUNTER
Patient is very overdue for A1c check and she has not seen me in well over a year.  I will have to see her in person before I can continue any prescriptions?

## 2024-07-23 DIAGNOSIS — E78.2 MIXED HYPERLIPIDEMIA: ICD-10-CM

## 2024-07-23 RX ORDER — ATORVASTATIN CALCIUM 10 MG/1
10 TABLET, FILM COATED ORAL DAILY
Qty: 30 TABLET | Refills: 0 | Status: SHIPPED | OUTPATIENT
Start: 2024-07-23

## 2024-07-23 NOTE — TELEPHONE ENCOUNTER
Has been well over a year since I saw patient.  Sent in 30 days of medication, she will need to see me within that time    History  Chief Complaint   Patient presents with    Vomiting     Pt c/o n/v since 1700  History provided by:  Patient and significant other   used: No    Vomiting   Severity:  Moderate  Timing:  Intermittent  Quality:  Unable to specify  Progression:  Unchanged  Chronicity:  New  Recent urination:  Increased  Relieved by:  None tried  Worsened by:  Nothing  Ineffective treatments:  None tried  Associated symptoms: abdominal pain, chills and headaches    Associated symptoms: no cough, no diarrhea, no fever, no myalgias and no sore throat    Risk factors: diabetes    Risk factors comment:  IV drug abuse      Prior to Admission Medications   Prescriptions Last Dose Informant Patient Reported? Taking?    amLODIPine (NORVASC) 10 mg tablet   Yes Yes   Sig: Take 10 mg by mouth   gabapentin (NEURONTIN) 100 mg capsule  Self Yes Yes   Sig: Take 100 mg by mouth 3 (three) times a day     insulin glargine (LANTUS) 100 units/mL subcutaneous injection Unknown at Unknown time  No No   Sig: Inject 50 Units under the skin daily   insulin lispro (HumaLOG) 100 units/mL injection 3/17/2018 at Unknown time  No Yes   Sig: Inject 8 Units under the skin 3 (three) times a day with meals   pantoprazole (PROTONIX) 40 mg tablet   Yes Yes   Sig: Take 40 mg by mouth   triamcinolone (KENALOG) 0 1 % cream   Yes No   Sig: Apply topically   venlafaxine (EFFEXOR-XR) 37 5 mg 24 hr capsule   Yes Yes   Sig: Take 37 5 mg by mouth      Facility-Administered Medications: None       Past Medical History:   Diagnosis Date    Cocaine abuse 3/17/2018    Diabetes mellitus ([facility] )     GERD (gastroesophageal reflux disease)     Hepatitis C     Hypertension     Leukocytosis (leucocytosis) 3/17/2018    MRSA (methicillin resistant Staphylococcus aureus) 3/17/2018    Seizures ([facility] )     last seizure Nov 2017       Past Surgical History:   Procedure Laterality Date    BACK SURGERY      lumbar     EYE SURGERY      bilateral    INCISION AND DRAINAGE ABSCESS / HEMATOMA OF BURSA / KNEE / THIGH Right        History reviewed  No pertinent family history  I have reviewed and agree with the history as documented  Social History   Substance Use Topics    Smoking status: Former Smoker     Packs/day: 0 50     Years: 2 00     Quit date: 3/17/2016    Smokeless tobacco: Never Used    Alcohol use No        Review of Systems   Constitutional: Positive for chills  Negative for diaphoresis and fever  HENT: Negative for congestion, sore throat and trouble swallowing  Eyes: Positive for visual disturbance  Respiratory: Negative for cough and shortness of breath  Cardiovascular: Negative for chest pain  Gastrointestinal: Positive for abdominal pain and vomiting  Negative for diarrhea  Endocrine: Positive for polyuria  Genitourinary: Negative for dysuria and hematuria  Musculoskeletal: Negative for back pain and myalgias  Skin: Negative for rash  Neurological: Positive for headaches  Negative for dizziness, syncope and numbness  Physical Exam  ED Triage Vitals [03/17/18 0443]   Temperature Pulse Respirations Blood Pressure SpO2   98 7 °F (37 1 °C) (!) 114 16 118/57 100 %      Temp Source Heart Rate Source Patient Position - Orthostatic VS BP Location FiO2 (%)   Oral Monitor Lying Right arm --      Pain Score       4           Orthostatic Vital Signs  Vitals:    03/19/18 0725 03/19/18 1350 03/19/18 1753 03/19/18 2316   BP: 114/73 118/81 129/79 125/83   Pulse: 88 100 102 91   Patient Position - Orthostatic VS:   Sitting Sitting       Physical Exam   Constitutional: She appears well-developed  No distress  HENT:   Head: Normocephalic and atraumatic  Right Ear: External ear normal    Left Ear: External ear normal    Mouth/Throat: Oropharynx is clear and moist    Eyes: Conjunctivae are normal    Neck: Neck supple  No JVD present  Cardiovascular: Regular rhythm  Tachycardia present  No murmur heard    Pulmonary/Chest: Effort normal and breath sounds normal    Abdominal: Soft  Bowel sounds are normal  There is generalized tenderness  There is no rebound, no guarding and negative Ryan's sign  Neurological: She is alert  No sensory deficit  Skin: Skin is warm and dry  Capillary refill takes less than 2 seconds  She is not diaphoretic         ED Medications  Medications   venlafaxine (EFFEXOR-XR) 24 hr capsule 37 5 mg (37 5 mg Oral Given 3/20/18 1007)   potassium chloride 20 mEq IVPB (premix) (20 mEq Intravenous Not Given 3/17/18 1456)   heparin (porcine) subcutaneous injection 5,000 Units (5,000 Units Subcutaneous Given 3/20/18 1350)   acetaminophen (TYLENOL) tablet 650 mg (650 mg Oral Given 3/20/18 0742)   ondansetron (ZOFRAN) injection 4 mg ( Intravenous MAR Unhold 3/19/18 1752)   gabapentin (NEURONTIN) capsule 100 mg (100 mg Oral Given 3/20/18 0843)   pantoprazole (PROTONIX) EC tablet 40 mg (40 mg Oral Given 3/20/18 0606)   morphine injection 1 mg (1 mg Intravenous Given 3/20/18 1350)   LORazepam (ATIVAN) 2 mg/mL injection 0 5 mg (0 5 mg Intravenous Given 3/20/18 1350)   insulin lispro (HumaLOG) 100 units/mL subcutaneous injection 1-5 Units (3 Units Subcutaneous Given 3/19/18 2138)   dextrose 50 % IV solution **AcuDose Override Pull** (not administered)   insulin lispro (HumaLOG) 100 units/mL subcutaneous injection 2-12 Units (2 Units Subcutaneous Given 3/20/18 1143)   insulin lispro (HumaLOG) 100 units/mL subcutaneous injection 12 Units (not administered)   insulin lispro (HumaLOG) 100 units/mL subcutaneous injection 12 Units (not administered)   insulin lispro (HumaLOG) 100 units/mL subcutaneous injection 12 Units (not administered)   insulin glargine (LANTUS) subcutaneous injection 40 Units (not administered)   ondansetron (ZOFRAN-ODT) dispersible tablet 4 mg (4 mg Oral Given 3/17/18 9468)   sodium chloride 0 9 % bolus 1,000 mL (0 mL Intravenous Stopped 3/17/18 0851)   sodium chloride 0 9 % bolus 1,000 mL (0 mL Intravenous Stopped 3/17/18 0957)   insulin lispro (HumaLOG) 100 units/mL subcutaneous injection 5 Units (5 Units Subcutaneous Given 3/17/18 0748)   sodium phosphate 12 mmol in sodium chloride 0 9 % 100 mL Infusion (0 mmol Intravenous Stopped 3/17/18 1510)   magnesium sulfate 4 g/100 mL IVPB (premix) 4 g (0 g Intravenous Stopped 3/17/18 1509)   potassium chloride (K-DUR,KLOR-CON) CR tablet 40 mEq (40 mEq Oral Given 3/17/18 1710)   promethazine (PHENERGAN) injection 12 5 mg (12 5 mg Intravenous Given 3/17/18 2342)   morphine injection 1 mg (1 mg Intravenous Given 3/18/18 0309)   sodium chloride 0 9 % bolus 2,000 mL (0 mL Intravenous Stopped 3/18/18 0726)   sodium chloride 0 9 % bolus 1,000 mL (0 mL Intravenous Stopped 3/18/18 1432)   potassium chloride (K-DUR,KLOR-CON) CR tablet 40 mEq (40 mEq Oral Given 3/19/18 0800)       Diagnostic Studies  Results Reviewed     Procedure Component Value Units Date/Time    Fingerstick Glucose (POCT) [[id number]]  (Abnormal) Collected:  03/17/18 1032    Lab Status:  Final result Updated:  03/17/18 1034     POC Glucose 201 (H) mg/dl     Fingerstick Glucose (POCT) [[id number]]  (Abnormal) Collected:  03/17/18 0939    Lab Status:  Final result Updated:  03/17/18 0941     POC Glucose 290 (H) mg/dl     Lactic acid, plasma [[id number]]  (Normal) Collected:  03/17/18 0759    Lab Status:  Final result Specimen:  Blood from Arm, Right Updated:  03/17/18 0826     LACTIC ACID 1 2 mmol/L     Narrative:         Result may be elevated if tourniquet was used during collection      Acetone [[id number]]  (Abnormal) Collected:  03/17/18 0759    Lab Status:  Final result Specimen:  Blood from Arm, Right Updated:  03/17/18 0816     Acetone, Bld 3+ (A)    Blood gas, venous [[id number]]  (Abnormal) Collected:  03/17/18 0759    Lab Status:  Final result Specimen:  Blood from Arm, Right Updated:  03/17/18 0814     pH, Rajeev 7 233 (L)     pCO2, Rajeev 25 6 (L) mm Hg      pO2, Rajeev 141 4 (H) mm Hg      HCO3, Rajeev 10 6 (L) mmol/L      Base Excess, Rajeev -15 3 mmol/L      O2 Content, Rajeev 15 8 ml/dL      O2 HGB, VENOUS 96 9 (H) %     Rapid drug screen, urine [[id number]]  (Abnormal) Collected:  03/17/18 0713    Lab Status:  Final result Specimen:  Urine from Urine, Clean Catch Updated:  03/17/18 0746     Amph/Meth UR Negative     Barbiturate Ur Negative     Benzodiazepine Urine Negative     Cocaine Urine Positive (A)     Methadone Urine Negative     Opiate Urine Positive (A)     PCP Ur Negative     THC Urine Negative    Narrative:         Presumptive report  If requested, specimen will be sent to reference lab for confirmation  FOR MEDICAL PURPOSES ONLY  IF CONFIRMATION NEEDED PLEASE CONTACT THE LAB WITHIN 5 DAYS      Drug Screen Cutoff Levels:  AMPHETAMINE/METHAMPHETAMINES  1000 ng/mL  BARBITURATES     200 ng/mL  BENZODIAZEPINES     200 ng/mL  COCAINE      300 ng/mL  METHADONE      300 ng/mL  OPIATES      300 ng/mL  PHENCYCLIDINE     25 ng/mL  THC       50 ng/mL    Urine Microscopic [[id number]]  (Abnormal) Collected:  03/17/18 0717    Lab Status:  Final result Specimen:  Urine from Urine, Clean Catch Updated:  03/17/18 0742     RBC, UA 0-1 (A) /hpf      WBC, UA 1-2 (A) /hpf      Epithelial Cells Occasional /hpf      Bacteria, UA None Seen /hpf     POCT pregnancy, urine [[id number]]  (Normal) Resulted:  03/17/18 0727    Lab Status:  Edited Result - FINAL Updated:  03/17/18 0727     EXT PREG TEST UR (Ref: Negative) negative    POCT urinalysis dipstick [[id number]]  (Abnormal) Resulted:  03/17/18 0727    Lab Status:  Final result Specimen:  Urine Updated:  03/17/18 0727    Fingerstick Glucose (POCT) [[id number]]  (Abnormal) Collected:  03/17/18 0723    Lab Status:  Final result Updated:  03/17/18 0725     POC Glucose 393 (H) mg/dl     ED Urine Macroscopic [[id number]]  (Abnormal) Collected:  03/17/18 0717    Lab Status:  Final result Specimen:  Urine Updated:  03/17/18 0718     Color, UA Yellow     Clarity, UA Clear     pH, UA 5 5     Leukocytes, UA Negative     Nitrite, UA Negative     Protein, UA Trace (A) mg/dl      Glucose,  (1/2%) (A) mg/dl      Ketones, UA >=160 (4+) (A) mg/dl      Urobilinogen, UA 0 2 E U /dl      Bilirubin, UA Negative     Blood, UA Trace (A)     Specific [place], UA 1 015    Narrative:       CLINITEK RESULT    Troponin I [[id number]]  (Normal) Collected:  03/17/18 3467    Lab Status:  Final result Specimen:  Blood from Arm, Left Updated:  03/17/18 0656     Troponin I <0 02 ng/mL     Narrative:         [employer] Chemistry analyzer 99% cutoff is > 0 04 ng/mL in network labs    o cTnI 99% cutoff is useful only when applied to patients in the clinical setting of myocardial ischemia  o cTnI 99% cutoff should be interpreted in the context of clinical history, ECG findings and possibly cardiac imaging to establish correct diagnosis  o cTnI 99% cutoff may be suggestive but clearly not indicative of a coronary event without the clinical setting of myocardial ischemia  Comprehensive metabolic panel [[id number]]  (Abnormal) Collected:  03/17/18 6440    Lab Status:  Final result Specimen:  Blood from Arm, Left Updated:  03/17/18 0654     Sodium 137 mmol/L      Potassium 5 3 mmol/L      Chloride 99 (L) mmol/L      CO2 13 (L) mmol/L      Anion Gap 25 (H) mmol/L      BUN 23 mg/dL      Creatinine 1 07 mg/dL      Glucose 478 (H) mg/dL      Calcium 9 2 mg/dL      AST 39 U/L      ALT 16 U/L      Alkaline Phosphatase 124 (H) U/L      Total Protein 8 2 g/dL      Albumin 3 9 g/dL      Total Bilirubin 0 73 mg/dL      eGFR 68 ml/min/1 73sq m     Narrative:         National Kidney Disease Education Program recommendations are as follows:  GFR calculation is accurate only with a steady state creatinine  Chronic Kidney disease less than 60 ml/min/1 73 sq  meters  Kidney failure less than 15 ml/min/1 73 sq  meters      Lipase [[id number]]  (Normal) Collected:  03/17/18 0621    Lab Status:  Final result Specimen:  Blood from Arm, Left Updated:  03/17/18 9119 Lipase 139 u/L     CBC and differential [[id number]]  (Abnormal) Collected:  03/17/18 0621    Lab Status:  Final result Specimen:  Blood from Arm, Left Updated:  03/17/18 0632     WBC 15 82 (H) Thousand/uL      RBC 3 93 Million/uL      Hemoglobin 10 5 (L) g/dL      Hematocrit 34 8 %      MCV 89 fL      MCH 26 7 (L) pg      MCHC 30 2 (L) g/dL      RDW 18 0 (H) %      MPV 11 7 fL      Platelets 162 Thousands/uL      nRBC 0 /100 WBCs      Neutrophils Relative 82 (H) %      Lymphocytes Relative 13 (L) %      Monocytes Relative 4 %      Eosinophils Relative 1 %      Basophils Relative 0 %      Neutrophils Absolute 13 08 (H) Thousands/µL      Lymphocytes Absolute 1 98 Thousands/µL      Monocytes Absolute 0 59 Thousand/µL      Eosinophils Absolute 0 13 Thousand/µL      Basophils Absolute 0 04 Thousands/µL                  XR chest pa & lateral   Final Result by Sherrie Ortega MD (03/17 1054)      No acute cardiopulmonary disease  Workstation performed: [id number]                    Procedures  Procedures       Phone Contacts  ED Phone Contact    ED Course  ED Course                                MDM  Number of Diagnoses or Management Options  Diabetes [facility]):   DKA (diabetic ketoacidoses) ([facility] ):   H/O noncompliance with medical treatment, presenting hazards to health:   Hepatitis C:   Heroin abuse:   IVDU (intravenous drug user):   Diagnosis management comments: 80-year-old female presents emergency department for persistent vomiting  Patient has past medical history significant for heroin abuse as well as diabetes  Patient's workup began at the end of my shift and was signed out to [employer] of on [facility]         Amount and/or Complexity of Data Reviewed  Clinical lab tests: reviewed      CritCare Time    Disposition  Final diagnoses:   IVDU (intravenous drug user)   Diabetes ([facility] )   Hepatitis C   H/O noncompliance with medical treatment, presenting hazards to health   Heroin abuse   DKA (diabetic ketoacidoses) ([place] [address] )     Time reflects when diagnosis was documented in both MDM as applicable and the Disposition within this note     Time User Action Codes Description Comment    3/17/2018  6:49 AM Braydon Nemo Add [F19 90] IVDU (intravenous drug user)     3/17/2018  6:50 AM Braydon Nemo Add [E11 9] Diabetes ([place] [address] )     3/17/2018  6:50 AM Braydon Nemo Add [B19 20] Hepatitis C     3/17/2018  6:50 AM Braydon Nemo Add [Z91 19] H/O noncompliance with medical treatment, presenting hazards to health     3/17/2018  6:51 AM Braydon Nemo Add [F11 10] Heroin abuse     3/17/2018  8:42 AM Kiya Fergusonon V Add [E13 10] DKA (diabetic ketoacidoses) ([place] [address] )     3/17/2018  9:12 AM Trevin John Add [E10 65] Type 1 diabetes mellitus with hyperglycemia ([place] [address] )     3/17/2018  9:12 AM Zhong John Modify [E10 65] Type 1 diabetes mellitus with hyperglycemia [facility])       ED Disposition     ED Disposition Condition Comment    Admit  Case was discussed with Dr Madison Mejia and the patient's admission status was agreed to be Admission Status: inpatient status to the service of Dr Madison Mejia   Follow-up Information    None       Current Discharge Medication List      CONTINUE these medications which have NOT CHANGED    Details   amLODIPine (NORVASC) 10 mg tablet Take 10 mg by mouth      gabapentin (NEURONTIN) 100 mg capsule Take 100 mg by mouth 3 (three) times a day        insulin lispro (HumaLOG) 100 units/mL injection Inject 8 Units under the skin 3 (three) times a day with meals  Qty: 3 mL, Refills: 0    Associated Diagnoses: Diabetic ketoacidosis without coma associated with type 1 diabetes mellitus ([place] [address] ); Hepatitis C virus infection without hepatic coma, unspecified chronicity;  Influenza A      pantoprazole (PROTONIX) 40 mg tablet Take 40 mg by mouth      venlafaxine (EFFEXOR-XR) 37 5 mg 24 hr capsule Take 37 5 mg by mouth      insulin glargine (LANTUS) 100 units/mL subcutaneous injection Inject 50 Units under the skin daily  Qty: 10 mL, Refills: 0    Associated Diagnoses: Diabetic ketoacidosis without coma associated with type 1 diabetes mellitus ([facility] ); Hepatitis C virus infection without hepatic coma, unspecified chronicity; Influenza A      triamcinolone (KENALOG) 0 1 % cream Apply topically           No discharge procedures on file      ED Provider  Electronically Signed by           Carina Arce PA-C  03/20/18 3566

## 2024-07-23 NOTE — TELEPHONE ENCOUNTER
Caller: Sushma Celi J    Relationship: Self    Best call back number: [phone]     Requested Prescriptions:   Requested Prescriptions     Pending Prescriptions Disp Refills    atorvastatin (LIPITOR) 10 MG tablet 90 tablet 1     Sig: Take 1 tablet by mouth Daily.        Pharmacy where request should be sent: [facility] [id number]  [place], [place] - [address] - [phone]  - [phone] FX     Last office visit with prescribing clinician: 1/9/2023   Last telemedicine visit with prescribing clinician: Visit date not found   Next office visit with prescribing clinician: Visit date not found     Additional details provided by patient: PATIENT IS OUT OF THIS MEDICATION.     Does the patient have less than a 3 day supply:  [x] Yes  [] No    Would you like a call back once the refill request has been completed: [] Yes [] No    If the office needs to give you a call back, can they leave a voicemail: [] Yes [] No    April Perez Allred Rep   07/23/24 11:24 EDT

## 2024-07-23 NOTE — TELEPHONE ENCOUNTER
Roberto pt 01/09/2023, after that she was diagnosed with breast cancer.    Do you want oncology to take over medication?

## 2024-08-13 ENCOUNTER — OFFICE VISIT (OUTPATIENT)
Dept: FAMILY MEDICINE CLINIC | Facility: CLINIC | Age: 69
End: 2024-08-13
Payer: MEDICARE

## 2024-08-13 VITALS
SYSTOLIC BLOOD PRESSURE: 138 MMHG | WEIGHT: 236.4 LBS | DIASTOLIC BLOOD PRESSURE: 74 MMHG | HEART RATE: 75 BPM | BODY MASS INDEX: 40.36 KG/M2 | HEIGHT: 64 IN | RESPIRATION RATE: 18 BRPM | OXYGEN SATURATION: 99 % | TEMPERATURE: 97.1 F

## 2024-08-13 DIAGNOSIS — E55.9 VITAMIN D DEFICIENCY: ICD-10-CM

## 2024-08-13 DIAGNOSIS — F32.A ANXIETY AND DEPRESSION: Primary | ICD-10-CM

## 2024-08-13 DIAGNOSIS — Z13.29 THYROID DISORDER SCREEN: ICD-10-CM

## 2024-08-13 DIAGNOSIS — E78.2 MIXED HYPERLIPIDEMIA: ICD-10-CM

## 2024-08-13 DIAGNOSIS — E11.65 TYPE 2 DIABETES MELLITUS WITH HYPERGLYCEMIA, WITHOUT LONG-TERM CURRENT USE OF INSULIN: ICD-10-CM

## 2024-08-13 DIAGNOSIS — E66.01 CLASS 3 SEVERE OBESITY DUE TO EXCESS CALORIES WITH SERIOUS COMORBIDITY AND BODY MASS INDEX (BMI) OF 40.0 TO 44.9 IN ADULT: ICD-10-CM

## 2024-08-13 DIAGNOSIS — F41.9 ANXIETY AND DEPRESSION: Primary | ICD-10-CM

## 2024-08-13 DIAGNOSIS — T88.7XXA DRUG SIDE EFFECTS: ICD-10-CM

## 2024-08-13 PROCEDURE — 99214 OFFICE O/P EST MOD 30 MIN: CPT | Performed by: STUDENT IN AN ORGANIZED HEALTH CARE EDUCATION/TRAINING PROGRAM

## 2024-08-13 PROCEDURE — 1111F DSCHRG MED/CURRENT MED MERGE: CPT | Performed by: STUDENT IN AN ORGANIZED HEALTH CARE EDUCATION/TRAINING PROGRAM

## 2024-08-13 PROCEDURE — 1126F AMNT PAIN NOTED NONE PRSNT: CPT | Performed by: STUDENT IN AN ORGANIZED HEALTH CARE EDUCATION/TRAINING PROGRAM

## 2024-08-13 RX ORDER — GABAPENTIN 300 MG/1
CAPSULE ORAL
COMMUNITY
Start: 2023-08-16

## 2024-08-13 RX ORDER — SERTRALINE HYDROCHLORIDE 25 MG/1
25 TABLET, FILM COATED ORAL DAILY
Qty: 30 TABLET | Refills: 1 | Status: SHIPPED | OUTPATIENT
Start: 2024-08-13

## 2024-08-13 RX ORDER — HYDROXYZINE HYDROCHLORIDE 10 MG/1
10 TABLET, FILM COATED ORAL 3 TIMES DAILY PRN
Qty: 90 TABLET | Refills: 0 | Status: SHIPPED | OUTPATIENT
Start: 2024-08-13

## 2024-08-13 RX ORDER — ZOLPIDEM TARTRATE 5 MG/1
TABLET ORAL
COMMUNITY
Start: 2024-07-31

## 2024-08-13 NOTE — PROGRESS NOTES
"Subjective   Celi Ordaz is a 69 y.o. female.   Chief Complaint   Patient presents with    Anxiety and depression       History of Present Illness     Annual wellness  -Had originally planned to do an annual Medicare wellness.  Patient has had a return of cancer since last appointment and is significantly distressed  -Decided to address anxiety depression first and postpone annual wellness to a later date  -Will go ahead and order the labs that were intended for annual wellness for today.     Reappearance of cancer  -Patient had uncontrollable diarrhea.  GI did a colonoscopy and found breast cancer that had metastasized to the colon via biopsies on September 2019  -Patient saw Dr. Reddy initially for managing breast cancer and kidney cancer.  A family member suggested seeing a different oncologist over at [facility].  Patient states this was not a good fit for her and she went back to Dr. Reddy  -Her oncologist was watching markers for different types of cancers, which appear to be stable or improving  -Repeat colonoscopy showed there was a lesion on the around the kidney and gallbladder and it was suggested to have excisional surgery to remove it completely  -Patient states she underwent surgery on 1/2024, had appendix removed and 8 inches of colon removed.  Scar tissue contracted down in that area  -She states the results found that there is cancer \"all throughout her small intestines\"    - Dr Wily Reddy is now only practicing at a [facility]  - pt is currently seeing Dr Marimar Yuen at [facility] (specializes of breast cancer) since 2/2024  -Some of patient's tumor markers had gone up.  After patient's appointment 2 weeks ago her blood markers show that they went up a little farther  -Patient will see hematology oncology in about 3 weeks to discuss whether she is going to change her chemo treatment or not      Anxiety and depression  -Patient was crying during appointment and before appointment " because of all the stress from the events illustrated above  - Has never been on anything for anxiety before  -Patient agreeable to try medication                The following portions of the patient's history were reviewed and updated as appropriate: allergies, current medications, past family history, past medical history, past social history, past surgical history, and problem list.    Patient Active Problem List   Diagnosis    Right leg pain    Low back pain    Tinnitus of both ears    Anemia    Arthralgia of both lower legs    Arthritis    Conjunctivitis    Breast cancer    Cancer of kidney, right    Cervical spondylosis without myelopathy    Malignant neoplasm of upper-outer quadrant of right female breast    Neck pain, acute    Osteoarthritis of lumbosacral spine with radiculopathy    Other spondylosis with radiculopathy, lumbar region    Chronic pain of right knee    Varicose veins of both lower extremities with inflammation    Chronic pain of left knee    H/O right nephrectomy    H/O total hysterectomy with bilateral salpingo-oophorectomy (BSO)    Breast cancer metastasized to large intestine, unspecified laterality    Stage 2 lobular carcinoma of breast, ER+, right    Morbidly obese    Type 2 diabetes mellitus with hyperglycemia    Mixed hyperlipidemia       Current Outpatient Medications on File Prior to Visit   Medication Sig Dispense Refill    acetaminophen (TYLENOL) 325 MG tablet Take 1 tablet by mouth Every 6 (Six) Hours As Needed for Mild Pain. PRN      atorvastatin (LIPITOR) 10 MG tablet Take 1 tablet by mouth Daily. 30 tablet 0    diclofenac (VOLTAREN) 1 % gel gel Dose is 4 grams for knees, ankles, feet.  Dose is 2 grams for elbows, wrists, hands. Apply 4 times a day as needed. 1 tube 12    diphenoxylate-atropine (LOMOTIL) 2.5-0.025 MG per tablet       fulvestrant (FASLODEX) 250 MG/5ML chemo syringe Inject  into the appropriate muscle as directed by prescriber 1 (One) Time. Unsure of dose @@ this  "time. Will be given monthly      gabapentin (NEURONTIN) 300 MG capsule Take 1 capsule 3 times a day by oral route.      Ibrance 125 MG tablet Take 1 tablet by mouth Daily. Take daily for 3 weeks then off one week, then repeat      metFORMIN ER (GLUCOPHAGE-XR) 500 MG 24 hr tablet TAKE ONE TABLET BY MOUTH DAILY WITH BREAKFAST 90 tablet 3    mupirocin (BACTROBAN) 2 % ointment APPLY TO NOSTRIL THE DAY BEFORE AND THE MORNING OF SURGERY. CY      zolpidem (AMBIEN) 5 MG tablet       [DISCONTINUED] traZODone (DESYREL) 50 MG tablet Take 1 tablet by mouth Every Night. 30 tablet 1     No current facility-administered medications on file prior to visit.     Current outpatient and discharge medications have been reconciled for the patient.  Reviewed by: Juliana Berry DO      No Known Allergies      Objective   Visit Vitals  /74 (BP Location: Left arm, Patient Position: Sitting, Cuff Size: Large Adult)   Pulse 75   Temp 97.1 °F (36.2 °C) (Skin)   Resp 18   Ht 162.6 cm (64\")   Wt 107 kg (236 lb 6.4 oz)   SpO2 99%   BMI 40.58 kg/m²       Physical Exam  Constitutional:       Comments: - Patient appears tearful and fatigue   HENT:      Head: Normocephalic and atraumatic.   Eyes:      Conjunctiva/sclera: Conjunctivae normal.   Neurological:      General: No focal deficit present.      Mental Status: She is alert and oriented to person, place, and time.   Psychiatric:         Mood and Affect: Mood normal.         Behavior: Behavior normal.           Diagnoses and all orders for this visit:    1. Anxiety and depression (Primary)  -  Discussed SSRIs, side effects, that it takes 4 to 6 weeks to see max benefit of the medication, but side effects typically will improve over time as the body gets adjusted.  If the side effects are \"annoying\" to give the body some time to get adjusted but if they are life impacting to call the office to switch medication before we see the pt next.  Patient verbalized understanding  -Started   " sertraline (Zoloft) 25 MG tablet; Take 1 tablet by mouth Daily.  Dispense: 30 tablet; Refill: 1  -   Started hydrOXYzine (ATARAX) 10 MG tablet; Take 1 tablet by mouth 3 (Three) Times a Day As Needed for Anxiety.  Dispense: 90 tablet; Refill: 0  -On after visit summary, told patient that she can take up to 50 mg of hydroxyzine 3 times daily as needed.  She might try it during the nighttime to see if it helps her sleep  -Patient's oncologist has prescribed her Ambien for now    2. Type 2 diabetes mellitus with hyperglycemia, without long-term current use of insulin  -     Hemoglobin A1c  -     Microalbumin / Creatinine Urine Ratio - Urine, Clean Catch    4. Mixed hyperlipidemia  -     Lipid panel    5. Drug side effects  -     CBC & Differential  -     Vitamin B12    6. Thyroid disorder screen  -     TSH Rfx On Abnormal To Free T4    7. Vitamin D deficiency  -     Vitamin D,25-Hydroxy    8. Class 3 severe obesity due to excess calories with serious comorbidity and body mass index (BMI) of 40.0 to 44.9 in adult             Follow Up  -4 weeks to follow-up on anxiety    Expected course, medications, and adverse effects discussed as appropriate.  Call or return if worsening or persistent symptoms. Hand hygiene was performed before donning protective equipment and after removal when leaving the room.    This document is intended for medical expert use only. Reading of this document by patients and/or patient's family without participating medical staff guidance may result in misinterpretation and unintended morbidity. Any interpretation of such data is the responsibility of the patient and/or family member responsible for the patient in concert with their primary or specialist providers, not to be left for sources of online searches such as [employer], [employer] or similar queries. Relying on these approaches to knowledge may result in misinterpretation, misguided goals of care and even death should patients or family members try  recommendations outside of the realm of professional medical care.

## 2024-08-13 NOTE — PATIENT INSTRUCTIONS
- You can take up to 50 mg of hydroxyzine 3 times daily as needed for anxiety.  May try 50 mg at night to see if it helps with sleep  -If side effects to the sertraline are unbearable/affect daily life or you experience increased anxiety or depression on the medication, call the office and notify us before your next appointment

## 2024-08-14 LAB
ALBUMIN/CREAT UR: 3 MG/G CREAT (ref 0–29)
CREAT UR-MCNC: 178.7 MG/DL
MICROALBUMIN UR-MCNC: 6.2 UG/ML

## 2024-08-20 DIAGNOSIS — E78.2 MIXED HYPERLIPIDEMIA: ICD-10-CM

## 2024-08-22 RX ORDER — ATORVASTATIN CALCIUM 10 MG/1
10 TABLET, FILM COATED ORAL DAILY
Qty: 20 TABLET | Refills: 0 | Status: SHIPPED | OUTPATIENT
Start: 2024-08-22

## 2024-08-22 NOTE — TELEPHONE ENCOUNTER
Has been over a year since I saw patient.  Originally sent in 30 days of atorvastatin to bridge patient to her next appointment.  She has her next appointment on 9/10/2024.  Sending in 20 tablets to bridge patient to this appointment

## 2024-08-27 ENCOUNTER — TELEPHONE (OUTPATIENT)
Dept: FAMILY MEDICINE CLINIC | Facility: CLINIC | Age: 69
End: 2024-08-27
Payer: MEDICARE

## 2024-08-27 DIAGNOSIS — E55.9 VITAMIN D DEFICIENCY: Primary | ICD-10-CM

## 2024-08-27 LAB
25(OH)D3+25(OH)D2 SERPL-MCNC: 14 NG/ML (ref 30–100)
BASOPHILS # BLD AUTO: 0.1 X10E3/UL (ref 0–0.2)
BASOPHILS NFR BLD AUTO: 3 %
CHOLEST SERPL-MCNC: 160 MG/DL (ref 100–199)
EOSINOPHIL # BLD AUTO: 0.1 X10E3/UL (ref 0–0.4)
EOSINOPHIL NFR BLD AUTO: 2 %
ERYTHROCYTE [DISTWIDTH] IN BLOOD BY AUTOMATED COUNT: 14.5 % (ref 11.7–15.4)
HBA1C MFR BLD: 6.8 % (ref 4.8–5.6)
HCT VFR BLD AUTO: 38.4 % (ref 34–46.6)
HDLC SERPL-MCNC: 66 MG/DL
HGB BLD-MCNC: 12.2 G/DL (ref 11.1–15.9)
IMM GRANULOCYTES # BLD AUTO: 0 X10E3/UL (ref 0–0.1)
IMM GRANULOCYTES NFR BLD AUTO: 0 %
LDLC SERPL CALC-MCNC: 73 MG/DL (ref 0–99)
LYMPHOCYTES # BLD AUTO: 0.8 X10E3/UL (ref 0.7–3.1)
LYMPHOCYTES NFR BLD AUTO: 26 %
MCH RBC QN AUTO: 34.1 PG (ref 26.6–33)
MCHC RBC AUTO-ENTMCNC: 31.8 G/DL (ref 31.5–35.7)
MCV RBC AUTO: 107 FL (ref 79–97)
MONOCYTES # BLD AUTO: 0.2 X10E3/UL (ref 0.1–0.9)
MONOCYTES NFR BLD AUTO: 8 %
NEUTROPHILS # BLD AUTO: 1.9 X10E3/UL (ref 1.4–7)
NEUTROPHILS NFR BLD AUTO: 61 %
PLATELET # BLD AUTO: 234 X10E3/UL (ref 150–450)
RBC # BLD AUTO: 3.58 X10E6/UL (ref 3.77–5.28)
TRIGL SERPL-MCNC: 117 MG/DL (ref 0–149)
TSH SERPL DL<=0.005 MIU/L-ACNC: 2.34 UIU/ML (ref 0.45–4.5)
VIT B12 SERPL-MCNC: 671 PG/ML (ref 232–1245)
VLDLC SERPL CALC-MCNC: 21 MG/DL (ref 5–40)
WBC # BLD AUTO: 3.2 X10E3/UL (ref 3.4–10.8)

## 2024-08-27 RX ORDER — ERGOCALCIFEROL 1.25 MG/1
50000 CAPSULE, LIQUID FILLED ORAL WEEKLY
Qty: 12 CAPSULE | Refills: 3 | Status: SHIPPED | OUTPATIENT
Start: 2024-08-27

## 2024-08-27 NOTE — TELEPHONE ENCOUNTER
Her vitamin D came back very low at 14.  Starting 50,000 IU of vitamin D for her to take weekly for supplementation and we can recheck at a later date.

## 2024-09-10 ENCOUNTER — OFFICE VISIT (OUTPATIENT)
Dept: FAMILY MEDICINE CLINIC | Facility: CLINIC | Age: 69
End: 2024-09-10
Payer: MEDICARE

## 2024-09-10 VITALS
DIASTOLIC BLOOD PRESSURE: 64 MMHG | RESPIRATION RATE: 18 BRPM | HEART RATE: 99 BPM | OXYGEN SATURATION: 98 % | HEIGHT: 64 IN | BODY MASS INDEX: 39.95 KG/M2 | TEMPERATURE: 97.3 F | WEIGHT: 234 LBS | SYSTOLIC BLOOD PRESSURE: 126 MMHG

## 2024-09-10 DIAGNOSIS — F41.9 ANXIETY AND DEPRESSION: Primary | ICD-10-CM

## 2024-09-10 DIAGNOSIS — F32.A ANXIETY AND DEPRESSION: Primary | ICD-10-CM

## 2024-09-10 DIAGNOSIS — E66.01 CLASS 3 SEVERE OBESITY DUE TO EXCESS CALORIES WITH SERIOUS COMORBIDITY AND BODY MASS INDEX (BMI) OF 40.0 TO 44.9 IN ADULT: ICD-10-CM

## 2024-09-10 PROCEDURE — 99213 OFFICE O/P EST LOW 20 MIN: CPT | Performed by: STUDENT IN AN ORGANIZED HEALTH CARE EDUCATION/TRAINING PROGRAM

## 2024-09-10 PROCEDURE — 3044F HG A1C LEVEL LT 7.0%: CPT | Performed by: STUDENT IN AN ORGANIZED HEALTH CARE EDUCATION/TRAINING PROGRAM

## 2024-09-10 PROCEDURE — 1111F DSCHRG MED/CURRENT MED MERGE: CPT | Performed by: STUDENT IN AN ORGANIZED HEALTH CARE EDUCATION/TRAINING PROGRAM

## 2024-09-10 PROCEDURE — 1126F AMNT PAIN NOTED NONE PRSNT: CPT | Performed by: STUDENT IN AN ORGANIZED HEALTH CARE EDUCATION/TRAINING PROGRAM

## 2024-09-10 RX ORDER — SERTRALINE HYDROCHLORIDE 25 MG/1
25 TABLET, FILM COATED ORAL DAILY
Qty: 30 TABLET | Refills: 1 | Status: SHIPPED | OUTPATIENT
Start: 2024-09-10

## 2024-09-10 RX ORDER — HYDROXYZINE HYDROCHLORIDE 10 MG/1
10 TABLET, FILM COATED ORAL 3 TIMES DAILY PRN
Qty: 90 TABLET | Refills: 0 | Status: SHIPPED | OUTPATIENT
Start: 2024-09-10

## 2024-09-10 NOTE — PROGRESS NOTES
"Subjective   Celi Ordaz is a 69 y.o. female.   Chief Complaint   Patient presents with    Anxiety and depression       History of Present Illness     Anxiety:  -Follow up from 08/13/2024: started sertraline 25mg and hydroxyzine TID prn  -Associated symptoms include: depressed mood and anxiety   -Severity is described per patient : Moderate  -Patient states medications are  working well.  But pt got medications backwards and is taking hydroxyzine 3-4 times a day and sertraline as needed      Right hip pain:  -Started over a year ago, getting worse  -Pain is aching, sharp on certain movements, can radiate down right leg  - had knee replacement 1 year ago and feels like she's \"going backwards\" gets shooting pains on quadriceps. Will get some hip pain. Thinks knee is affecting her hip  - has not noticed an abnormal gait and patient is less confident on steps now  -Treatment includes Bengay, Voltaren gel, Gabapentin, Tylenol with some improvement  -Patient will be discussing this with her orthopedic surgeon tomorrow (Dr. Irvin Tatum)      The following portions of the patient's history were reviewed and updated as appropriate: allergies, current medications, past family history, past medical history, past social history, past surgical history, and problem list.    Patient Active Problem List   Diagnosis    Right leg pain    Low back pain    Tinnitus of both ears    Anemia    Arthralgia of both lower legs    Arthritis    Conjunctivitis    Breast cancer    Cancer of kidney, right    Cervical spondylosis without myelopathy    Malignant neoplasm of upper-outer quadrant of right female breast    Neck pain, acute    Osteoarthritis of lumbosacral spine with radiculopathy    Other spondylosis with radiculopathy, lumbar region    Chronic pain of right knee    Varicose veins of both lower extremities with inflammation    Chronic pain of left knee    H/O right nephrectomy    H/O total hysterectomy with bilateral " salpingo-oophorectomy (BSO)    Breast cancer metastasized to large intestine, unspecified laterality    Stage 2 lobular carcinoma of breast, ER+, right    Morbidly obese    Type 2 diabetes mellitus with hyperglycemia    Mixed hyperlipidemia    Vitamin D deficiency       Current Outpatient Medications on File Prior to Visit   Medication Sig Dispense Refill    acetaminophen (TYLENOL) 325 MG tablet Take 1 tablet by mouth Every 6 (Six) Hours As Needed for Mild Pain. PRN      atorvastatin (LIPITOR) 10 MG tablet TAKE 1 TABLET BY MOUTH DAILY 20 tablet 0    Cyanocobalamin (B-12 PO)       diclofenac (VOLTAREN) 1 % gel gel Dose is 4 grams for knees, ankles, feet.  Dose is 2 grams for elbows, wrists, hands. Apply 4 times a day as needed. 1 tube 12    diphenoxylate-atropine (LOMOTIL) 2.5-0.025 MG per tablet       fulvestrant (FASLODEX) 250 MG/5ML chemo syringe Inject  into the appropriate muscle as directed by prescriber 1 (One) Time. Unsure of dose @@ this time. Will be given monthly      gabapentin (NEURONTIN) 300 MG capsule Take 1 capsule 3 times a day by oral route.      Ibrance 125 MG tablet Take 1 tablet by mouth Daily. Take daily for 3 weeks then off one week, then repeat      metFORMIN ER (GLUCOPHAGE-XR) 500 MG 24 hr tablet TAKE ONE TABLET BY MOUTH DAILY WITH BREAKFAST 90 tablet 3    mupirocin (BACTROBAN) 2 % ointment APPLY TO NOSTRIL THE DAY BEFORE AND THE MORNING OF SURGERY. CY      vitamin D (ERGOCALCIFEROL) 1.25 MG (94867 UT) capsule capsule Take 1 capsule by mouth 1 (One) Time Per Week. 12 capsule 3    zolpidem (AMBIEN) 5 MG tablet       [DISCONTINUED] hydrOXYzine (ATARAX) 10 MG tablet Take 1 tablet by mouth 3 (Three) Times a Day As Needed for Anxiety. 90 tablet 0    [DISCONTINUED] sertraline (Zoloft) 25 MG tablet Take 1 tablet by mouth Daily. 30 tablet 1     No current facility-administered medications on file prior to visit.     Current outpatient and discharge medications have been reconciled for the  "patient.  Reviewed by: Juliana Berry DO      No Known Allergies      Objective   Visit Vitals  /64 (BP Location: Left arm, Patient Position: Sitting, Cuff Size: Large Adult)   Pulse 99   Temp 97.3 °F (36.3 °C) (Skin)   Resp 18   Ht 162.6 cm (64\")   Wt 106 kg (234 lb)   SpO2 98%   BMI 40.17 kg/m²       Physical Exam  HENT:      Head: Normocephalic and atraumatic.   Eyes:      Conjunctiva/sclera: Conjunctivae normal.   Neurological:      General: No focal deficit present.      Mental Status: She is alert and oriented to person, place, and time.   Psychiatric:         Mood and Affect: Mood normal.         Behavior: Behavior normal.           Diagnoses and all orders for this visit:    1. Anxiety and depression (Primary)  -Discussed with patient that she will need to be taking the sertraline daily and hydroxyzine only as needed instead.  Patient verbalized understanding and will switch how she takes her medication  -  refilled   sertraline (Zoloft) 25 MG tablet; Take 1 tablet by mouth Daily.  Dispense: 30 tablet; Refill: 1  -   Refilled hydrOXYzine (ATARAX) 10 MG tablet; Take 1 tablet by mouth 3 (Three) Times a Day As Needed for Anxiety.  Dispense: 90 tablet; Refill: 0      -Discussed that I think it be a great idea for her to bring this up to her orthopedic surgeon.  Hesitant to do imaging through [facility] as patient's orthopedic surgeon is out of network and he will likely want to see his own images.  Discussed that I anticipate physical therapy will be part of her treatment plan    2. Class 3 severe obesity due to excess calories with serious comorbidity and body mass index (BMI) of 40.0 to 44.9 in adult             Follow Up  - 4-6 weeks anxiety     Expected course, medications, and adverse effects discussed as appropriate.  Call or return if worsening or persistent symptoms. Hand hygiene was performed before donning protective equipment and after removal when leaving the room.    This document is intended " for medical expert use only. Reading of this document by patients and/or patient's family without participating medical staff guidance may result in misinterpretation and unintended morbidity. Any interpretation of such data is the responsibility of the patient and/or family member responsible for the patient in concert with their primary or specialist providers, not to be left for sources of online searches such as [employer], [employer] or similar queries. Relying on these approaches to knowledge may result in misinterpretation, misguided goals of care and even death should patients or family members try recommendations outside of the realm of professional medical care.

## 2024-09-20 DIAGNOSIS — E78.2 MIXED HYPERLIPIDEMIA: ICD-10-CM

## 2024-09-21 RX ORDER — ATORVASTATIN CALCIUM 10 MG/1
10 TABLET, FILM COATED ORAL DAILY
Qty: 25 TABLET | Refills: 0 | Status: SHIPPED | OUTPATIENT
Start: 2024-09-21

## 2024-11-01 NOTE — PROGRESS NOTES
Subjective   Celi Ordaz is a 69 y.o. female.   Chief Complaint   Patient presents with    Anxiety and depression       History of Present Illness     Anxiety/Depression:  -Follow up from 9/10/2024: Patient was prescribed sertraline 25 mg daily and hydroxyzine 10 mg 3 times daily as needed.  She was actually taking it in the reverse and taking the hydroxyzine daily and sertraline as needed.  Recommended she take sertraline daily and hydroxyzine as needed  -Associated symptoms include: depressed mood and anxiety   -Severity is described per patient : Moderate  -Patient states she is now taking the sertraline daily and hydroxyzine as needed and that the medication is working better for her  -She likes this dose and would like to continue      Preventative  - Pt will consider Tdap, Shingrix, Prevnar 20 vaccines    The following portions of the patient's history were reviewed and updated as appropriate: allergies, current medications, past family history, past medical history, past social history, past surgical history, and problem list.    Patient Active Problem List   Diagnosis    Right leg pain    Low back pain    Tinnitus of both ears    Anemia    Arthralgia of both lower legs    Arthritis    Conjunctivitis    Breast cancer    Cancer of kidney, right    Cervical spondylosis without myelopathy    Malignant neoplasm of upper-outer quadrant of right female breast    Neck pain, acute    Osteoarthritis of lumbosacral spine with radiculopathy    Other spondylosis with radiculopathy, lumbar region    Chronic pain of right knee    Varicose veins of both lower extremities with inflammation    Chronic pain of left knee    H/O right nephrectomy    H/O total hysterectomy with bilateral salpingo-oophorectomy (BSO)    Breast cancer metastasized to large intestine, unspecified laterality    Stage 2 lobular carcinoma of breast, ER+, right    Morbidly obese    Type 2 diabetes mellitus with hyperglycemia    Mixed hyperlipidemia     Vitamin D deficiency    Anxiety and depression       Current Outpatient Medications on File Prior to Visit   Medication Sig Dispense Refill    acetaminophen (TYLENOL) 325 MG tablet Take 1 tablet by mouth Every 6 (Six) Hours As Needed for Mild Pain. PRN      atorvastatin (LIPITOR) 10 MG tablet Take 1 tablet by mouth Daily. 90 tablet 3    Cyanocobalamin (B-12 PO)       diclofenac (VOLTAREN) 1 % gel gel Dose is 4 grams for knees, ankles, feet.  Dose is 2 grams for elbows, wrists, hands. Apply 4 times a day as needed. 1 tube 12    diphenoxylate-atropine (LOMOTIL) 2.5-0.025 MG per tablet       fulvestrant (FASLODEX) 250 MG/5ML chemo syringe Inject  into the appropriate muscle as directed by prescriber 1 (One) Time. Unsure of dose @@ this time. Will be given monthly      gabapentin (NEURONTIN) 300 MG capsule Take 1 capsule 3 times a day by oral route.      hydrOXYzine (ATARAX) 10 MG tablet Take 1 tablet by mouth 3 (Three) Times a Day As Needed for Anxiety. 90 tablet 0    Ibrance 125 MG tablet Take 1 tablet by mouth Daily. Take daily for 3 weeks then off one week, then repeat      metFORMIN ER (GLUCOPHAGE-XR) 500 MG 24 hr tablet TAKE ONE TABLET BY MOUTH DAILY WITH BREAKFAST 90 tablet 3    mupirocin (BACTROBAN) 2 % ointment APPLY TO NOSTRIL THE DAY BEFORE AND THE MORNING OF SURGERY. CY      vitamin D (ERGOCALCIFEROL) 1.25 MG (52258 UT) capsule capsule Take 1 capsule by mouth 1 (One) Time Per Week. 12 capsule 3    zolpidem (AMBIEN) 5 MG tablet       [DISCONTINUED] sertraline (Zoloft) 25 MG tablet Take 1 tablet by mouth Daily. 30 tablet 1     No current facility-administered medications on file prior to visit.     Current outpatient and discharge medications have been reconciled for the patient.  Reviewed by: Juliana Berry, DO      No Known Allergies      Objective   Visit Vitals  /60 (BP Location: Left arm, Patient Position: Sitting, Cuff Size: Large Adult)   Pulse 80   Temp 98.3 °F (36.8 °C) (Skin)   Resp 16   Ht  "162.6 cm (64\")   Wt 107 kg (235 lb)   SpO2 99%   BMI 40.34 kg/m²       Physical Exam  HENT:      Head: Normocephalic and atraumatic.   Eyes:      Conjunctiva/sclera: Conjunctivae normal.   Neurological:      General: No focal deficit present.      Mental Status: She is alert and oriented to person, place, and time.   Psychiatric:         Mood and Affect: Mood normal.         Behavior: Behavior normal.           Diagnoses and all orders for this visit:    1. Anxiety and depression (Primary)  - Patient doing well/controlled on current regimen.  Will continue current regimen     - refilled  sertraline (Zoloft) 25 MG tablet; Take 1 tablet by mouth Daily.  Dispense: 90 tablet; Refill: 1    2. Morbidly obese  Class 3 Severe Obesity (BMI >=40). Obesity-related health conditions include the following: dyslipidemias. Obesity is unchanged. BMI is is above average; BMI management plan is completed. We discussed portion control and increasing exercise.     Follow Up  - late November or early December for annual wellness    Expected course, medications, and adverse effects discussed as appropriate.  Call or return if worsening or persistent symptoms. Hand hygiene was performed before donning protective equipment and after removal when leaving the room.    This document is intended for medical expert use only. Reading of this document by patients and/or patient's family without participating medical staff guidance may result in misinterpretation and unintended morbidity. Any interpretation of such data is the responsibility of the patient and/or family member responsible for the patient in concert with their primary or specialist providers, not to be left for sources of online searches such as [employer], [employer] or similar queries. Relying on these approaches to knowledge may result in misinterpretation, misguided goals of care and even death should patients or family members try recommendations outside of the realm of professional " medical care.

## 2024-11-04 ENCOUNTER — OFFICE VISIT (OUTPATIENT)
Dept: FAMILY MEDICINE CLINIC | Facility: CLINIC | Age: 69
End: 2024-11-04
Payer: MEDICARE

## 2024-11-04 VITALS
HEART RATE: 80 BPM | BODY MASS INDEX: 40.12 KG/M2 | RESPIRATION RATE: 16 BRPM | HEIGHT: 64 IN | TEMPERATURE: 98.3 F | WEIGHT: 235 LBS | OXYGEN SATURATION: 99 % | SYSTOLIC BLOOD PRESSURE: 120 MMHG | DIASTOLIC BLOOD PRESSURE: 60 MMHG

## 2024-11-04 DIAGNOSIS — F32.A ANXIETY AND DEPRESSION: Primary | ICD-10-CM

## 2024-11-04 DIAGNOSIS — E66.01 MORBIDLY OBESE: ICD-10-CM

## 2024-11-04 DIAGNOSIS — F41.9 ANXIETY AND DEPRESSION: Primary | ICD-10-CM

## 2024-11-04 PROCEDURE — 3044F HG A1C LEVEL LT 7.0%: CPT | Performed by: STUDENT IN AN ORGANIZED HEALTH CARE EDUCATION/TRAINING PROGRAM

## 2024-11-04 PROCEDURE — 1111F DSCHRG MED/CURRENT MED MERGE: CPT | Performed by: STUDENT IN AN ORGANIZED HEALTH CARE EDUCATION/TRAINING PROGRAM

## 2024-11-04 PROCEDURE — 99213 OFFICE O/P EST LOW 20 MIN: CPT | Performed by: STUDENT IN AN ORGANIZED HEALTH CARE EDUCATION/TRAINING PROGRAM

## 2024-11-04 PROCEDURE — 1126F AMNT PAIN NOTED NONE PRSNT: CPT | Performed by: STUDENT IN AN ORGANIZED HEALTH CARE EDUCATION/TRAINING PROGRAM

## 2024-11-04 RX ORDER — ATORVASTATIN CALCIUM 10 MG/1
10 TABLET, FILM COATED ORAL DAILY
Qty: 90 TABLET | Refills: 3 | Status: CANCELLED | OUTPATIENT
Start: 2024-11-04

## 2024-11-04 RX ORDER — SERTRALINE HYDROCHLORIDE 25 MG/1
25 TABLET, FILM COATED ORAL DAILY
Qty: 90 TABLET | Refills: 1 | Status: SHIPPED | OUTPATIENT
Start: 2024-11-04

## 2024-11-19 DIAGNOSIS — E55.9 VITAMIN D DEFICIENCY: ICD-10-CM

## 2024-11-21 RX ORDER — ERGOCALCIFEROL 1.25 MG/1
50000 CAPSULE, LIQUID FILLED ORAL WEEKLY
Qty: 12 CAPSULE | Refills: 3 | Status: SHIPPED | OUTPATIENT
Start: 2024-11-21

## 2024-12-10 ENCOUNTER — TELEPHONE (OUTPATIENT)
Dept: FAMILY MEDICINE CLINIC | Facility: CLINIC | Age: 69
End: 2024-12-10

## 2024-12-10 ENCOUNTER — OFFICE VISIT (OUTPATIENT)
Dept: FAMILY MEDICINE CLINIC | Facility: CLINIC | Age: 69
End: 2024-12-10
Payer: MEDICARE

## 2024-12-10 VITALS
DIASTOLIC BLOOD PRESSURE: 68 MMHG | WEIGHT: 233.8 LBS | BODY MASS INDEX: 39.91 KG/M2 | HEIGHT: 64 IN | OXYGEN SATURATION: 100 % | SYSTOLIC BLOOD PRESSURE: 130 MMHG | RESPIRATION RATE: 16 BRPM | TEMPERATURE: 98.8 F | HEART RATE: 83 BPM

## 2024-12-10 DIAGNOSIS — E11.65 TYPE 2 DIABETES MELLITUS WITH HYPERGLYCEMIA, WITHOUT LONG-TERM CURRENT USE OF INSULIN: ICD-10-CM

## 2024-12-10 DIAGNOSIS — F41.9 ANXIETY AND DEPRESSION: ICD-10-CM

## 2024-12-10 DIAGNOSIS — Z09 ENCOUNTER FOR FOLLOW-UP EXAMINATION AFTER COMPLETED TREATMENT FOR CONDITIONS OTHER THAN MALIGNANT NEOPLASM: ICD-10-CM

## 2024-12-10 DIAGNOSIS — Z12.31 SCREENING MAMMOGRAM FOR BREAST CANCER: ICD-10-CM

## 2024-12-10 DIAGNOSIS — F32.A ANXIETY AND DEPRESSION: ICD-10-CM

## 2024-12-10 DIAGNOSIS — E55.9 VITAMIN D DEFICIENCY: ICD-10-CM

## 2024-12-10 DIAGNOSIS — Z00.00 MEDICARE ANNUAL WELLNESS VISIT, SUBSEQUENT: Primary | ICD-10-CM

## 2024-12-10 DIAGNOSIS — Z13.820 OSTEOPOROSIS SCREENING: ICD-10-CM

## 2024-12-10 DIAGNOSIS — E78.2 MIXED HYPERLIPIDEMIA: ICD-10-CM

## 2024-12-10 DIAGNOSIS — T88.7XXA DRUG SIDE EFFECTS: ICD-10-CM

## 2024-12-10 DIAGNOSIS — E66.01 MORBIDLY OBESE: ICD-10-CM

## 2024-12-10 PROCEDURE — G0439 PPPS, SUBSEQ VISIT: HCPCS | Performed by: STUDENT IN AN ORGANIZED HEALTH CARE EDUCATION/TRAINING PROGRAM

## 2024-12-10 PROCEDURE — 99213 OFFICE O/P EST LOW 20 MIN: CPT | Performed by: STUDENT IN AN ORGANIZED HEALTH CARE EDUCATION/TRAINING PROGRAM

## 2024-12-10 PROCEDURE — 1126F AMNT PAIN NOTED NONE PRSNT: CPT | Performed by: STUDENT IN AN ORGANIZED HEALTH CARE EDUCATION/TRAINING PROGRAM

## 2024-12-10 PROCEDURE — 3044F HG A1C LEVEL LT 7.0%: CPT | Performed by: STUDENT IN AN ORGANIZED HEALTH CARE EDUCATION/TRAINING PROGRAM

## 2024-12-10 PROCEDURE — 1111F DSCHRG MED/CURRENT MED MERGE: CPT | Performed by: STUDENT IN AN ORGANIZED HEALTH CARE EDUCATION/TRAINING PROGRAM

## 2024-12-10 RX ORDER — SERTRALINE HYDROCHLORIDE 25 MG/1
25 TABLET, FILM COATED ORAL DAILY
Qty: 90 TABLET | Refills: 1 | Status: SHIPPED | OUTPATIENT
Start: 2024-12-10

## 2024-12-10 NOTE — PROGRESS NOTES
Subjective   Celi Ordaz is a 69 y.o. female.   Chief Complaint   Patient presents with    Medicare Wellness-subsequent       History of Present Illness     Mixed hyperlipidemia  -Patient states that her daughters tested positive for familial hypercholesterolemia and they are urging patient to get tested to see if they genetically inherited it from her or their father  -Patient is asking for genetic testing  -Currently her own cholesterol is well-managed on atorvastatin 10 mg daily  -She states one of her daughters had a cardiac calcium score done (which she did not understand)    The following portions of the patient's history were reviewed and updated as appropriate: allergies, current medications, past family history, past medical history, past social history, past surgical history, and problem list.    Patient Active Problem List   Diagnosis    Right leg pain    Low back pain    Tinnitus of both ears    Anemia    Arthralgia of both lower legs    Arthritis    Conjunctivitis    Breast cancer    Cancer of kidney, right    Cervical spondylosis without myelopathy    Malignant neoplasm of upper-outer quadrant of right female breast    Neck pain, acute    Osteoarthritis of lumbosacral spine with radiculopathy    Other spondylosis with radiculopathy, lumbar region    Chronic pain of right knee    Varicose veins of both lower extremities with inflammation    Chronic pain of left knee    H/O right nephrectomy    H/O total hysterectomy with bilateral salpingo-oophorectomy (BSO)    Breast cancer metastasized to large intestine, unspecified laterality    Stage 2 lobular carcinoma of breast, ER+, right    Morbidly obese    Type 2 diabetes mellitus with hyperglycemia    Mixed hyperlipidemia    Vitamin D deficiency    Anxiety and depression       Current Outpatient Medications on File Prior to Visit   Medication Sig Dispense Refill    acetaminophen (TYLENOL) 325 MG tablet Take 1 tablet by mouth Every 6 (Six) Hours As Needed  "for Mild Pain. PRN      atorvastatin (LIPITOR) 10 MG tablet Take 1 tablet by mouth Daily. 90 tablet 3    Cyanocobalamin (B-12 PO)       diclofenac (VOLTAREN) 1 % gel gel Dose is 4 grams for knees, ankles, feet.  Dose is 2 grams for elbows, wrists, hands. Apply 4 times a day as needed. 1 tube 12    diphenoxylate-atropine (LOMOTIL) 2.5-0.025 MG per tablet       fulvestrant (FASLODEX) 250 MG/5ML chemo syringe Inject  into the appropriate muscle as directed by prescriber 1 (One) Time. Unsure of dose @@ this time. Will be given monthly      gabapentin (NEURONTIN) 300 MG capsule Take 1 capsule 3 times a day by oral route.      hydrOXYzine (ATARAX) 10 MG tablet Take 1 tablet by mouth 3 (Three) Times a Day As Needed for Anxiety. 90 tablet 0    Ibrance 125 MG tablet Take 1 tablet by mouth Daily. Take daily for 3 weeks then off one week, then repeat      metFORMIN ER (GLUCOPHAGE-XR) 500 MG 24 hr tablet TAKE ONE TABLET BY MOUTH DAILY WITH BREAKFAST 90 tablet 3    mupirocin (BACTROBAN) 2 % ointment APPLY TO NOSTRIL THE DAY BEFORE AND THE MORNING OF SURGERY. CY      vitamin D (ERGOCALCIFEROL) 1.25 MG (68463 UT) capsule capsule TAKE 1 CAPSULE BY MOUTH ONCE WEEKLY 12 capsule 3    zolpidem (AMBIEN) 5 MG tablet       [DISCONTINUED] sertraline (Zoloft) 25 MG tablet Take 1 tablet by mouth Daily. 90 tablet 1     No current facility-administered medications on file prior to visit.     Current outpatient and discharge medications have been reconciled for the patient.  Reviewed by: Juliana Berry DO      No Known Allergies      Objective   Visit Vitals  /68 (BP Location: Left arm, Patient Position: Sitting, Cuff Size: Large Adult)   Pulse 83   Temp 98.8 °F (37.1 °C) (Skin)   Resp 16   Ht 162.6 cm (64\")   Wt 106 kg (233 lb 12.8 oz)   SpO2 100%   BMI 40.13 kg/m²       Diagnoses and all orders for this visit:      4. Mixed hyperlipidemia  -     Lipid panel  -Discussed with patient that knowing whether she has familial " hypercholesterolemia does not  or screening.  Discussed with patient that I am not sure  whether she is positive or not would change any treatment regarding herself or her daughters  -Patient verbalized understanding.  However her daughter is really wanted her to get checked and patient was interested in getting this checked.  Told her I would look into this for her and I have no problem putting an order in for her if she needs it  - In case patient was concerned about heart disease for herself, gave her the [facility] $99 out-of-pocket heart screening bundle flyer.  Told her that if she wants to have this done to call and let us know so we can place the orders for her.  She verbalized understanding          Expected course, medications, and adverse effects discussed as appropriate.  Call or return if worsening or persistent symptoms. Hand hygiene was performed before donning protective equipment and after removal when leaving the room.    This document is intended for medical expert use only. Reading of this document by patients and/or patient's family without participating medical staff guidance may result in misinterpretation and unintended morbidity. Any interpretation of such data is the responsibility of the patient and/or family member responsible for the patient in concert with their primary or specialist providers, not to be left for sources of online searches such as [employer] or similar queries. Relying on these approaches to knowledge may result in misinterpretation, misguided goals of care and even death should patients or family members try recommendations outside of the realm of professional medical care.

## 2024-12-10 NOTE — ASSESSMENT & PLAN NOTE
Patient's (There is no height or weight on file to calculate BMI.) indicates that they are morbidly/severely obese (BMI > 40 or > 35 with obesity - related health condition) with health conditions that include diabetes mellitus . Weight is unchanged. BMI  is above average; BMI management plan is completed. We discussed portion control and increasing exercise.

## 2024-12-10 NOTE — PROGRESS NOTES
Subjective   The ABCs of the Annual Wellness Visit  Medicare Wellness Visit      Celi Ordaz is a 69 y.o. patient who presents for a Medicare Wellness Visit.    The following portions of the patient's history were reviewed and   updated as appropriate: allergies, current medications, past family history, past medical history, past social history, past surgical history, and problem list.    Compared to one year ago, the patient's physical   health is the same.  Compared to one year ago, the patient's mental   health is better.      Recent Hospitalizations:  She was admitted within the past 365 days at [facility].     Current Medical Providers:  Patient Care Team:  Juliana Berry DO as PCP - General (Family Medicine)  Marimar Yuen MD as Consulting Physician (Hematology and Oncology)  Bishop Alvarez OD (Optometry)  Irvin Tatum II, MD as Consulting Physician (Orthopedic Surgery)    Outpatient Medications Prior to Visit   Medication Sig Dispense Refill    acetaminophen (TYLENOL) 325 MG tablet Take 1 tablet by mouth Every 6 (Six) Hours As Needed for Mild Pain. PRN      atorvastatin (LIPITOR) 10 MG tablet Take 1 tablet by mouth Daily. 90 tablet 3    Cyanocobalamin (B-12 PO)       diclofenac (VOLTAREN) 1 % gel gel Dose is 4 grams for knees, ankles, feet.  Dose is 2 grams for elbows, wrists, hands. Apply 4 times a day as needed. 1 tube 12    diphenoxylate-atropine (LOMOTIL) 2.5-0.025 MG per tablet       fulvestrant (FASLODEX) 250 MG/5ML chemo syringe Inject  into the appropriate muscle as directed by prescriber 1 (One) Time. Unsure of dose @@ this time. Will be given monthly      gabapentin (NEURONTIN) 300 MG capsule Take 1 capsule 3 times a day by oral route.      hydrOXYzine (ATARAX) 10 MG tablet Take 1 tablet by mouth 3 (Three) Times a Day As Needed for Anxiety. 90 tablet 0    Ibrance 125 MG tablet Take 1 tablet by mouth Daily. Take daily for 3 weeks then off one week, then repeat       metFORMIN ER (GLUCOPHAGE-XR) 500 MG 24 hr tablet TAKE ONE TABLET BY MOUTH DAILY WITH BREAKFAST 90 tablet 3    mupirocin (BACTROBAN) 2 % ointment APPLY TO NOSTRIL THE DAY BEFORE AND THE MORNING OF SURGERY. CY      sertraline (Zoloft) 25 MG tablet Take 1 tablet by mouth Daily. 90 tablet 1    vitamin D (ERGOCALCIFEROL) 1.25 MG (29224 UT) capsule capsule TAKE 1 CAPSULE BY MOUTH ONCE WEEKLY 12 capsule 3    zolpidem (AMBIEN) 5 MG tablet        No facility-administered medications prior to visit.     No opioid medication identified on active medication list. I have reviewed chart for other potential  high risk medication/s and harmful drug interactions in the elderly.      Aspirin is not on active medication list.  Aspirin use is not indicated based on review of current medical condition/s. Risk of harm outweighs potential benefits.  .    Patient Active Problem List   Diagnosis    Right leg pain    Low back pain    Tinnitus of both ears    Anemia    Arthralgia of both lower legs    Arthritis    Conjunctivitis    Breast cancer    Cancer of kidney, right    Cervical spondylosis without myelopathy    Malignant neoplasm of upper-outer quadrant of right female breast    Neck pain, acute    Osteoarthritis of lumbosacral spine with radiculopathy    Other spondylosis with radiculopathy, lumbar region    Chronic pain of right knee    Varicose veins of both lower extremities with inflammation    Chronic pain of left knee    H/O right nephrectomy    H/O total hysterectomy with bilateral salpingo-oophorectomy (BSO)    Breast cancer metastasized to large intestine, unspecified laterality    Stage 2 lobular carcinoma of breast, ER+, right    Morbidly obese    Type 2 diabetes mellitus with hyperglycemia    Mixed hyperlipidemia    Vitamin D deficiency    Anxiety and depression     Advance Care Planning Advance Directive is on file.  ACP discussion was held with the patient during this visit. Patient has a living will and power of  ".  She will give us a copy to scanned into her chart            Objective   Vitals:    12/10/24 1527   BP: 130/68   BP Location: Left arm   Patient Position: Sitting   Cuff Size: Large Adult   Pulse: 83   Resp: 16   Temp: 98.8 °F (37.1 °C)   TempSrc: Skin   SpO2: 100%   Weight: 106 kg (233 lb 12.8 oz)   Height: 162.6 cm (64\")       Estimated body mass index is 40.13 kg/m² as calculated from the following:    Height as of this encounter: 162.6 cm (64\").    Weight as of this encounter: 106 kg (233 lb 12.8 oz).            Does the patient have evidence of cognitive impairment? No    - ACE III minico/30                                                                                               Health  Risk Assessment    Smoking Status:  Social History     Tobacco Use   Smoking Status Never    Passive exposure: Never   Smokeless Tobacco Never     Alcohol Consumption:  Social History     Substance and Sexual Activity   Alcohol Use Never       Fall Risk Screen  STEADI Fall Risk Assessment was completed, and patient is at LOW risk for falls.Assessment completed on:12/10/2024    Depression Screening   Little interest or pleasure in doing things? Not at all   Feeling down, depressed, or hopeless? Not at all   PHQ-2 Total Score 0   Trouble falling or staying asleep, or sleeping too much? Not at all   Feeling tired or having little energy? Not at all   Poor appetite or overeating? Not at all   Feeling bad about yourself - or that you are a failure or have let yourself or your family down? Not at all   Trouble concentrating on things, such as reading the newspaper or watching television? Not at all   Moving or speaking so slowly that other people could have noticed? Or the opposite - being so fidgety or restless that you have been moving around a lot more than usual? Not at all   Thoughts that you would be better off dead, or of hurting yourself in some way? Not at all   PHQ-9 Total Score 0   If you checked off " any problems, how difficult have these problems made it for you to do your work, take care of things at home, or get along with other people? Not difficult at all      Health Habits and Functional and Cognitive Screenin/3/2024     3:21 PM   Functional & Cognitive Status   Do you have difficulty preparing food and eating? No    Do you have difficulty bathing yourself, getting dressed or grooming yourself? No    Do you have difficulty using the toilet? No    Do you have difficulty moving around from place to place? No    Do you have trouble with steps or getting out of a bed or a chair? No    Current Diet Well Balanced Diet    Dental Exam Up to date    Eye Exam Up to date    Exercise (times per week) 2 times per week    Current Exercises Include House Cleaning;Walking    Do you need help using the phone?  No    Are you deaf or do you have serious difficulty hearing?  No    Do you need help to go to places out of walking distance? No    Do you need help shopping? No    Do you need help preparing meals?  No    Do you need help with housework?  No    Do you need help with laundry? No    Do you need help taking your medications? No    Do you need help managing money? No    Do you ever drive or ride in a car without wearing a seat belt? No    Have you felt unusual stress, anger or loneliness in the last month? No    Who do you live with? Spouse    If you need help, do you have trouble finding someone available to you? No    Have you been bothered in the last four weeks by sexual problems? No    Do you have difficulty concentrating, remembering or making decisions? No        Patient-reported           Age-appropriate Screening Schedule:  Refer to the list below for future screening recommendations based on patient's age, sex and/or medical conditions. Orders for these recommended tests are listed in the plan section. The patient has been provided with a written plan.    Health Maintenance List  Health Maintenance    Topic Date Due    PAP SMEAR  Never done    DXA SCAN  12/08/2024    HEMOGLOBIN A1C  02/26/2025    TDAP/TD VACCINES (1 - Tdap) 12/10/2024 (Originally 4/27/1974)    Pneumococcal Vaccine 65+ (1 of 2 - PCV) 01/08/2025 (Originally 4/27/1961)    ZOSTER VACCINE (1 of 2) 11/04/2025 (Originally 4/27/2006)    URINE MICROALBUMIN  08/13/2025    DIABETIC EYE EXAM  08/14/2025    LIPID PANEL  08/26/2025    MAMMOGRAM  09/05/2025    ANNUAL WELLNESS VISIT  12/10/2025    BMI FOLLOWUP  12/10/2025    HEPATITIS C SCREENING  Completed    COVID-19 Vaccine  Completed    INFLUENZA VACCINE  Completed    COLONOSCOPY  Discontinued      Physical Exam  Constitutional:       General: She is not in acute distress.  HENT:      Head: Normocephalic and atraumatic.      Right Ear: Tympanic membrane normal.      Left Ear: Tympanic membrane normal.      Nose: Nose normal.      Mouth/Throat:      Mouth: Mucous membranes are moist.      Pharynx: Oropharynx is clear. No posterior oropharyngeal erythema.   Eyes:      Extraocular Movements: Extraocular movements intact.      Conjunctiva/sclera: Conjunctivae normal.   Neck:      Comments: - Thyroid not enlarged  Cardiovascular:      Rate and Rhythm: Normal rate and regular rhythm.      Heart sounds: Normal heart sounds.   Pulmonary:      Effort: Pulmonary effort is normal.      Breath sounds: Normal breath sounds. No stridor. No wheezing.   Abdominal:      General: Abdomen is flat. Bowel sounds are normal.      Palpations: Abdomen is soft. There is no mass.      Tenderness: There is no abdominal tenderness. There is no guarding.   Musculoskeletal:         General: No swelling or deformity. Normal range of motion.      Cervical back: Normal range of motion and neck supple.   Skin:     General: Skin is warm and dry.      Capillary Refill: Capillary refill takes less than 2 seconds.      Coloration: Skin is not jaundiced.      Findings: No rash.   Neurological:      General: No focal deficit present.      Mental  "Status: She is alert and oriented to person, place, and time.      Cranial Nerves: No cranial nerve deficit.      Motor: No weakness.      Coordination: Coordination normal.      Gait: Gait normal.      Deep Tendon Reflexes: Reflexes normal.   Psychiatric:         Mood and Affect: Mood normal.         Behavior: Behavior normal.         Thought Content: Thought content normal.                                                                                                                                                 CMS Preventative Services Quick Reference  Risk Factors Identified During Encounter  Immunizations Discussed/Encouraged: Tdap    The above risks/problems have been discussed with the patient.  Pertinent information has been shared with the patient in the After Visit Summary.  An After Visit Summary and PPPS were made available to the patient.      1. Medicare annual wellness visit, subsequent (Primary)  -Patient will bring us a copy of her power of  and living well to scanned to her chart  -Overall normal 69-year-old female exam  -Pertinent screening labs ordered per below  -Patient signed transfer of information form to get her latest colonoscopy added to her chart (states she had it in January 2024)  -Patient had hysterectomy due to fibroids, not a Pap smear candidate  -Patient states she talked with her hematology oncologist who told her that it was not necessary to have a mammogram done this year.  Patient requesting to have mammogram done \"just in case\", this was ordered per below  -Offered Dexa Scan, pt agrees, order placed  -Pt will contact insurance for coverage of Tdap vaccine    2. Anxiety and depression  -   refilled medication  sertraline (Zoloft) 25 MG tablet; Take 1 tablet by mouth Daily.  Dispense: 90 tablet; Refill: 1    3. Type 2 diabetes mellitus with hyperglycemia, without long-term current use of insulin  -     Hemoglobin A1c    4. Mixed hyperlipidemia  -     Lipid " panel  -Discussed with patient that knowing whether she has familial hypercholesterolemia does not  or screening.  Discussed with patient that I am not sure  whether she is positive or not would change any treatment regarding herself or her daughters  -Patient verbalized understanding.  However her daughter is really wanted her to get checked and patient was interested in getting this checked.  Told her I would look into this for her and I have no problem putting an order in for her if she needs it  - In case patient was concerned about heart disease for herself, gave her the [facility] $99 out-of-pocket heart screening bundle flyer.  Told her that if she wants to have this done to call and let us know so we can place the orders for her.  She verbalized understanding    5. Drug side effects  -     CBC & Differential  -     Comprehensive metabolic panel  -     Vitamin B12    6. Vitamin D deficiency  -     Vitamin D,25-Hydroxy    8. Encounter for follow-up examination after completed treatment for conditions other than malignant neoplasm/ Osteoporosis screening  -     DEXA Bone Density Axial    9. Screening mammogram for breast cancer  -     Mammo Screening Digital Tomosynthesis Bilateral With CAD; Future    10. Morbidly obese  -Information about nutrition and standardized exercise recommendations added to patient's after visit summary      Follow-up  -1 year for annual wellness exam  - 6 months for anxiety check in

## 2024-12-29 DIAGNOSIS — E11.65 TYPE 2 DIABETES MELLITUS WITH HYPERGLYCEMIA, WITHOUT LONG-TERM CURRENT USE OF INSULIN: ICD-10-CM

## 2024-12-30 RX ORDER — METFORMIN HYDROCHLORIDE 500 MG/1
500 TABLET, EXTENDED RELEASE ORAL
Qty: 90 TABLET | Refills: 3 | Status: SHIPPED | OUTPATIENT
Start: 2024-12-30

## 2024-12-31 ENCOUNTER — HOSPITAL ENCOUNTER (OUTPATIENT)
Dept: BONE DENSITY | Facility: HOSPITAL | Age: 69
Discharge: HOME | End: 2024-12-31
Payer: MEDICARE

## 2024-12-31 ENCOUNTER — HOSPITAL ENCOUNTER (OUTPATIENT)
Dept: MAMMOGRAPHY | Facility: HOSPITAL | Age: 69
Discharge: HOME | End: 2024-12-31
Payer: MEDICARE

## 2024-12-31 DIAGNOSIS — Z12.31 SCREENING MAMMOGRAM FOR BREAST CANCER: ICD-10-CM

## 2024-12-31 PROBLEM — M85.89 OSTEOPENIA OF MULTIPLE SITES: Status: ACTIVE | Noted: 2024-12-31

## 2024-12-31 LAB
25(OH)D3+25(OH)D2 SERPL-MCNC: 39.1 NG/ML (ref 30–100)
ALBUMIN SERPL-MCNC: 4.2 G/DL (ref 3.9–4.9)
ALP SERPL-CCNC: 67 IU/L (ref 44–121)
ALT SERPL-CCNC: 12 IU/L (ref 0–32)
AST SERPL-CCNC: 19 IU/L (ref 0–40)
BASOPHILS # BLD AUTO: 0.1 X10E3/UL (ref 0–0.2)
BASOPHILS NFR BLD AUTO: 3 %
BILIRUB SERPL-MCNC: 0.5 MG/DL (ref 0–1.2)
BUN SERPL-MCNC: 13 MG/DL (ref 8–27)
BUN/CREAT SERPL: 12 (ref 12–28)
CALCIUM SERPL-MCNC: 9.3 MG/DL (ref 8.7–10.3)
CHLORIDE SERPL-SCNC: 106 MMOL/L (ref 96–106)
CHOLEST SERPL-MCNC: 191 MG/DL (ref 100–199)
CO2 SERPL-SCNC: 24 MMOL/L (ref 20–29)
CREAT SERPL-MCNC: 1.05 MG/DL (ref 0.57–1)
EGFRCR SERPLBLD CKD-EPI 2021: 58 ML/MIN/1.73
EOSINOPHIL # BLD AUTO: 0.1 X10E3/UL (ref 0–0.4)
EOSINOPHIL NFR BLD AUTO: 3 %
ERYTHROCYTE [DISTWIDTH] IN BLOOD BY AUTOMATED COUNT: 14 % (ref 11.7–15.4)
GLOBULIN SER CALC-MCNC: 2.4 G/DL (ref 1.5–4.5)
GLUCOSE SERPL-MCNC: 123 MG/DL (ref 70–99)
HBA1C MFR BLD: 6.5 % (ref 4.8–5.6)
HCT VFR BLD AUTO: 37.7 % (ref 34–46.6)
HDLC SERPL-MCNC: 69 MG/DL
HGB BLD-MCNC: 12.4 G/DL (ref 11.1–15.9)
IMM GRANULOCYTES # BLD AUTO: 0 X10E3/UL (ref 0–0.1)
IMM GRANULOCYTES NFR BLD AUTO: 0 %
LDLC SERPL CALC-MCNC: 101 MG/DL (ref 0–99)
LYMPHOCYTES # BLD AUTO: 0.9 X10E3/UL (ref 0.7–3.1)
LYMPHOCYTES NFR BLD AUTO: 30 %
MCH RBC QN AUTO: 33.6 PG (ref 26.6–33)
MCHC RBC AUTO-ENTMCNC: 32.9 G/DL (ref 31.5–35.7)
MCV RBC AUTO: 102 FL (ref 79–97)
MONOCYTES # BLD AUTO: 0.2 X10E3/UL (ref 0.1–0.9)
MONOCYTES NFR BLD AUTO: 5 %
MORPHOLOGY BLD-IMP: ABNORMAL
NEUTROPHILS # BLD AUTO: 1.8 X10E3/UL (ref 1.4–7)
NEUTROPHILS NFR BLD AUTO: 59 %
PLATELET # BLD AUTO: 356 X10E3/UL (ref 150–450)
POTASSIUM SERPL-SCNC: 4.7 MMOL/L (ref 3.5–5.2)
PROT SERPL-MCNC: 6.6 G/DL (ref 6–8.5)
RBC # BLD AUTO: 3.69 X10E6/UL (ref 3.77–5.28)
SODIUM SERPL-SCNC: 143 MMOL/L (ref 134–144)
TRIGL SERPL-MCNC: 118 MG/DL (ref 0–149)
VIT B12 SERPL-MCNC: 814 PG/ML (ref 232–1245)
VLDLC SERPL CALC-MCNC: 21 MG/DL (ref 5–40)
WBC # BLD AUTO: 3 X10E3/UL (ref 3.4–10.8)

## 2024-12-31 PROCEDURE — 77080 DXA BONE DENSITY AXIAL: CPT

## 2024-12-31 PROCEDURE — 77067 SCR MAMMO BI INCL CAD: CPT

## 2024-12-31 PROCEDURE — 77063 BREAST TOMOSYNTHESIS BI: CPT

## 2025-06-05 NOTE — PROGRESS NOTES
Subjective   Celi Ordaz is a 70 y.o. female.   Chief Complaint   Patient presents with    Diabetes    Anxiety       History of Present Illness      Anxiety and depression   - Follow up from 12/10/2024: Patient reported symptoms were controlled on sertraline 25 mg and hydroxyzine as needed.  Refilled medication  Today  - Patient presents for routine 6-month check and  -Patient states medications are  working well.  Patient would like to stay on current regimen  - doesn't need refills of hydroxyzine at this time      Type 2 diabetes  -Follow-up from 12/30/2024: A1c 6.5% on metformin  mg daily and atorvastatin 10 mg  Today  - A1C:  5.9%  - doesn't seem to be eating as much and doing daily activities but no major lifestyle changes        The following portions of the patient's history were reviewed and updated as appropriate: allergies, current medications, past family history, past medical history, past social history, past surgical history, and problem list.    Patient Active Problem List   Diagnosis    Right leg pain    Low back pain    Tinnitus of both ears    Anemia    Arthralgia of both lower legs    Arthritis    Conjunctivitis    Breast cancer    Cancer of kidney, right    Cervical spondylosis without myelopathy    Malignant neoplasm of upper-outer quadrant of right female breast    Neck pain, acute    Osteoarthritis of lumbosacral spine with radiculopathy    Other spondylosis with radiculopathy, lumbar region    Chronic pain of right knee    Varicose veins of both lower extremities with inflammation    Chronic pain of left knee    H/O right nephrectomy    H/O total hysterectomy with bilateral salpingo-oophorectomy (BSO)    Breast cancer metastasized to large intestine, unspecified laterality    Stage 2 lobular carcinoma of breast, ER+, right    Morbidly obese    Type 2 diabetes mellitus with hyperglycemia    Mixed hyperlipidemia    Vitamin D deficiency    Anxiety and depression    Osteopenia of  "multiple sites       Current Outpatient Medications on File Prior to Visit   Medication Sig Dispense Refill    acetaminophen (TYLENOL) 325 MG tablet Take 1 tablet by mouth Every 6 (Six) Hours As Needed for Mild Pain. PRN      atorvastatin (LIPITOR) 10 MG tablet Take 1 tablet by mouth Daily. 90 tablet 3    Cyanocobalamin (B-12 PO)       diclofenac (VOLTAREN) 1 % gel gel Dose is 4 grams for knees, ankles, feet.  Dose is 2 grams for elbows, wrists, hands. Apply 4 times a day as needed. 1 tube 12    fulvestrant (FASLODEX) 250 MG/5ML chemo syringe Inject  into the appropriate muscle as directed by prescriber 1 (One) Time. Unsure of dose @@ this time. Will be given monthly      gabapentin (NEURONTIN) 300 MG capsule Take 1 capsule 3 times a day by oral route.      hydrOXYzine (ATARAX) 10 MG tablet Take 1 tablet by mouth 3 (Three) Times a Day As Needed for Anxiety. 90 tablet 0    Ibrance 125 MG tablet Take 1 tablet by mouth Daily. Take daily for 3 weeks then off one week, then repeat      metFORMIN ER (GLUCOPHAGE-XR) 500 MG 24 hr tablet Take 1 tablet by mouth Daily With Breakfast. 90 tablet 3    vitamin D (ERGOCALCIFEROL) 1.25 MG (59775 UT) capsule capsule TAKE 1 CAPSULE BY MOUTH ONCE WEEKLY 12 capsule 3    zolpidem (AMBIEN) 5 MG tablet       [DISCONTINUED] sertraline (Zoloft) 25 MG tablet Take 1 tablet by mouth Daily. 90 tablet 1    [DISCONTINUED] diphenoxylate-atropine (LOMOTIL) 2.5-0.025 MG per tablet       [DISCONTINUED] mupirocin (BACTROBAN) 2 % ointment APPLY TO NOSTRIL THE DAY BEFORE AND THE MORNING OF SURGERY. CY       No current facility-administered medications on file prior to visit.     Current outpatient and discharge medications have been reconciled for the patient.  Reviewed by: Juliana Berry, DO      No Known Allergies      Objective   Visit Vitals  /60 (BP Location: Left arm, Patient Position: Sitting, Cuff Size: Large Adult)   Pulse 95   Temp 97.6 °F (36.4 °C) (Skin)   Resp 16   Ht 162.6 cm (64\") "   Wt 104 kg (229 lb 6.4 oz)   SpO2 98%   BMI 39.38 kg/m²       Physical Exam  HENT:      Head: Normocephalic and atraumatic.   Eyes:      Conjunctiva/sclera: Conjunctivae normal.   Skin:     Comments: - No broken skin over bilateral feet   Neurological:      General: No focal deficit present.      Mental Status: She is alert and oriented to person, place, and time.   Psychiatric:         Mood and Affect: Mood normal.         Behavior: Behavior normal.         Diabetic Foot Exam Performed and Monofilament Test Performed  Sensation diminished on heels, normal sensation elsewhere on feet      Diagnoses and all orders for this visit:    1. Type 2 diabetes mellitus without complication, without long-term current use of insulin (Primary)  -     POC Glycosylated Hemoglobin (Hb A1C): 5.9%  - Will maintain current regimen    2. Anxiety and depression  - Patient doing well/controlled on current regimen.  Will continue current regimen    - refilled  sertraline (Zoloft) 25 MG tablet; Take 1 tablet by mouth Daily.  Dispense: 90 tablet; Refill: 1    3. Class 2 severe obesity due to excess calories with serious comorbidity and body mass index (BMI) of 39.0 to 39.9 in adult  Class 3 Severe Obesity (BMI >=40). Obesity-related health conditions include the following: diabetes mellitus and dyslipidemias. Obesity is unchanged. BMI is is above average; BMI management plan is completed. We discussed portion control and increasing exercise.       Follow Up  - 12/16/2025 for annual physical exam and anxiety  - 3 months A1C check    Expected course, medications, and adverse effects discussed as appropriate.  Call or return if worsening or persistent symptoms. Hand hygiene was performed before donning protective equipment and after removal when leaving the room.    This document is intended for medical expert use only. Reading of this document by patients and/or patient's family without participating medical staff guidance may result in  misinterpretation and unintended morbidity. Any interpretation of such data is the responsibility of the patient and/or family member responsible for the patient in concert with their primary or specialist providers, not to be left for sources of online searches such as [employer], [employer] or similar queries. Relying on these approaches to knowledge may result in misinterpretation, misguided goals of care and even death should patients or family members try recommendations outside of the realm of professional medical care.

## 2025-06-10 ENCOUNTER — OFFICE VISIT (OUTPATIENT)
Dept: FAMILY MEDICINE CLINIC | Facility: CLINIC | Age: 70
End: 2025-06-10
Payer: MEDICARE

## 2025-06-10 VITALS
DIASTOLIC BLOOD PRESSURE: 60 MMHG | RESPIRATION RATE: 16 BRPM | WEIGHT: 229.4 LBS | OXYGEN SATURATION: 98 % | TEMPERATURE: 97.6 F | HEART RATE: 95 BPM | BODY MASS INDEX: 39.16 KG/M2 | SYSTOLIC BLOOD PRESSURE: 124 MMHG | HEIGHT: 64 IN

## 2025-06-10 DIAGNOSIS — E66.01 CLASS 2 SEVERE OBESITY DUE TO EXCESS CALORIES WITH SERIOUS COMORBIDITY AND BODY MASS INDEX (BMI) OF 39.0 TO 39.9 IN ADULT: ICD-10-CM

## 2025-06-10 DIAGNOSIS — F41.9 ANXIETY AND DEPRESSION: ICD-10-CM

## 2025-06-10 DIAGNOSIS — E11.9 TYPE 2 DIABETES MELLITUS WITHOUT COMPLICATION, WITHOUT LONG-TERM CURRENT USE OF INSULIN: Primary | ICD-10-CM

## 2025-06-10 DIAGNOSIS — E66.812 CLASS 2 SEVERE OBESITY DUE TO EXCESS CALORIES WITH SERIOUS COMORBIDITY AND BODY MASS INDEX (BMI) OF 39.0 TO 39.9 IN ADULT: ICD-10-CM

## 2025-06-10 DIAGNOSIS — F32.A ANXIETY AND DEPRESSION: ICD-10-CM

## 2025-06-10 LAB
EXPIRATION DATE: ABNORMAL
HBA1C MFR BLD: 5.9 % (ref 4.5–5.7)
Lab: ABNORMAL

## 2025-06-10 RX ORDER — HYDROXYZINE HYDROCHLORIDE 10 MG/1
10 TABLET, FILM COATED ORAL 3 TIMES DAILY PRN
Qty: 90 TABLET | Refills: 0 | Status: CANCELLED | OUTPATIENT
Start: 2025-06-10

## 2025-06-10 RX ORDER — SERTRALINE HYDROCHLORIDE 25 MG/1
25 TABLET, FILM COATED ORAL DAILY
Qty: 90 TABLET | Refills: 1 | Status: SHIPPED | OUTPATIENT
Start: 2025-06-10

## 2025-07-22 ENCOUNTER — APPOINTMENT (OUTPATIENT)
Dept: CT IMAGING | Facility: HOSPITAL | Age: 70
End: 2025-07-22
Payer: MEDICARE

## 2025-07-22 ENCOUNTER — HOSPITAL ENCOUNTER (EMERGENCY)
Facility: HOSPITAL | Age: 70
Discharge: HOME | End: 2025-07-22
Attending: EMERGENCY MEDICINE | Admitting: EMERGENCY MEDICINE
Payer: MEDICARE

## 2025-07-22 VITALS
HEIGHT: 64 IN | RESPIRATION RATE: 17 BRPM | HEART RATE: 70 BPM | OXYGEN SATURATION: 96 % | BODY MASS INDEX: 38.24 KG/M2 | SYSTOLIC BLOOD PRESSURE: 118 MMHG | DIASTOLIC BLOOD PRESSURE: 67 MMHG | TEMPERATURE: 98.5 F | WEIGHT: 223.99 LBS

## 2025-07-22 DIAGNOSIS — R10.9 ABDOMINAL PAIN, UNSPECIFIED ABDOMINAL LOCATION: ICD-10-CM

## 2025-07-22 DIAGNOSIS — K52.9 COLITIS: Primary | ICD-10-CM

## 2025-07-22 LAB
ALBUMIN SERPL-MCNC: 4.2 G/DL (ref 3.5–5.2)
ALBUMIN/GLOB SERPL: 1.6 G/DL
ALP SERPL-CCNC: 76 U/L (ref 39–117)
ALT SERPL W P-5'-P-CCNC: 14 U/L (ref 1–33)
ANION GAP SERPL CALCULATED.3IONS-SCNC: 14.5 MMOL/L (ref 5–15)
AST SERPL-CCNC: 27 U/L (ref 1–32)
BACTERIA UR QL AUTO: ABNORMAL /HPF
BASOPHILS # BLD AUTO: 0.13 10*3/MM3 (ref 0–0.2)
BASOPHILS NFR BLD AUTO: 2.8 % (ref 0–1.5)
BILIRUB SERPL-MCNC: 0.4 MG/DL (ref 0–1.2)
BILIRUB UR QL STRIP: NEGATIVE
BUN SERPL-MCNC: 14.7 MG/DL (ref 8–23)
BUN/CREAT SERPL: 13.7 (ref 7–25)
CALCIUM SPEC-SCNC: 9.1 MG/DL (ref 8.6–10.5)
CHLORIDE SERPL-SCNC: 107 MMOL/L (ref 98–107)
CLARITY UR: CLEAR
CO2 SERPL-SCNC: 20.5 MMOL/L (ref 22–29)
COLOR UR: YELLOW
CREAT SERPL-MCNC: 1.07 MG/DL (ref 0.57–1)
DEPRECATED RDW RBC AUTO: 59.6 FL (ref 37–54)
EGFRCR SERPLBLD CKD-EPI 2021: 56.3 ML/MIN/1.73
EOSINOPHIL # BLD AUTO: 0.06 10*3/MM3 (ref 0–0.4)
EOSINOPHIL NFR BLD AUTO: 1.3 % (ref 0.3–6.2)
ERYTHROCYTE [DISTWIDTH] IN BLOOD BY AUTOMATED COUNT: 15 % (ref 12.3–15.4)
GLOBULIN UR ELPH-MCNC: 2.7 GM/DL
GLUCOSE SERPL-MCNC: 150 MG/DL (ref 65–99)
GLUCOSE UR STRIP-MCNC: NEGATIVE MG/DL
HCT VFR BLD AUTO: 36.5 % (ref 34–46.6)
HGB BLD-MCNC: 11.3 G/DL (ref 12–15.9)
HGB UR QL STRIP.AUTO: NEGATIVE
HOLD SPECIMEN: NORMAL
HYALINE CASTS UR QL AUTO: ABNORMAL /LPF
IMM GRANULOCYTES # BLD AUTO: 0.03 10*3/MM3 (ref 0–0.05)
IMM GRANULOCYTES NFR BLD AUTO: 0.6 % (ref 0–0.5)
KETONES UR QL STRIP: ABNORMAL
LEUKOCYTE ESTERASE UR QL STRIP.AUTO: ABNORMAL
LIPASE SERPL-CCNC: 21 U/L (ref 13–60)
LYMPHOCYTES # BLD AUTO: 0.7 10*3/MM3 (ref 0.7–3.1)
LYMPHOCYTES NFR BLD AUTO: 15.2 % (ref 19.6–45.3)
MCH RBC QN AUTO: 33.3 PG (ref 26.6–33)
MCHC RBC AUTO-ENTMCNC: 31 G/DL (ref 31.5–35.7)
MCV RBC AUTO: 107.7 FL (ref 79–97)
MONOCYTES # BLD AUTO: 0.26 10*3/MM3 (ref 0.1–0.9)
MONOCYTES NFR BLD AUTO: 5.6 % (ref 5–12)
NEUTROPHILS NFR BLD AUTO: 3.44 10*3/MM3 (ref 1.7–7)
NEUTROPHILS NFR BLD AUTO: 74.5 % (ref 42.7–76)
NITRITE UR QL STRIP: NEGATIVE
NRBC BLD AUTO-RTO: 0 /100 WBC (ref 0–0.2)
PH UR STRIP.AUTO: 6 [PH] (ref 5–8)
PLATELET # BLD AUTO: 430 10*3/MM3 (ref 140–450)
PMV BLD AUTO: 8.5 FL (ref 6–12)
POTASSIUM SERPL-SCNC: 4.3 MMOL/L (ref 3.5–5.2)
PROT SERPL-MCNC: 6.9 G/DL (ref 6–8.5)
PROT UR QL STRIP: ABNORMAL
RBC # BLD AUTO: 3.39 10*6/MM3 (ref 3.77–5.28)
RBC # UR STRIP: ABNORMAL /HPF
REF LAB TEST METHOD: ABNORMAL
SODIUM SERPL-SCNC: 142 MMOL/L (ref 136–145)
SP GR UR STRIP: 1.02 (ref 1–1.03)
SQUAMOUS #/AREA URNS HPF: ABNORMAL /HPF
UROBILINOGEN UR QL STRIP: ABNORMAL
WBC # UR STRIP: ABNORMAL /HPF
WBC NRBC COR # BLD AUTO: 4.62 10*3/MM3 (ref 3.4–10.8)
WHOLE BLOOD HOLD COAG: NORMAL

## 2025-07-22 PROCEDURE — 25510000001 IOPAMIDOL PER 1 ML: Performed by: EMERGENCY MEDICINE

## 2025-07-22 PROCEDURE — 99285 EMERGENCY DEPT VISIT HI MDM: CPT

## 2025-07-22 PROCEDURE — 25010000002 MORPHINE PER 10 MG: Performed by: EMERGENCY MEDICINE

## 2025-07-22 PROCEDURE — 85025 COMPLETE CBC W/AUTO DIFF WBC: CPT | Performed by: EMERGENCY MEDICINE

## 2025-07-22 PROCEDURE — 80053 COMPREHEN METABOLIC PANEL: CPT | Performed by: EMERGENCY MEDICINE

## 2025-07-22 PROCEDURE — 25010000002 HYDROMORPHONE PER 4 MG: Performed by: EMERGENCY MEDICINE

## 2025-07-22 PROCEDURE — 96374 THER/PROPH/DIAG INJ IV PUSH: CPT

## 2025-07-22 PROCEDURE — 74177 CT ABD & PELVIS W/CONTRAST: CPT

## 2025-07-22 PROCEDURE — 25010000002 ONDANSETRON PER 1 MG: Performed by: EMERGENCY MEDICINE

## 2025-07-22 PROCEDURE — 83690 ASSAY OF LIPASE: CPT | Performed by: EMERGENCY MEDICINE

## 2025-07-22 PROCEDURE — 81001 URINALYSIS AUTO W/SCOPE: CPT | Performed by: EMERGENCY MEDICINE

## 2025-07-22 PROCEDURE — 96375 TX/PRO/DX INJ NEW DRUG ADDON: CPT

## 2025-07-22 RX ORDER — IOPAMIDOL 755 MG/ML
100 INJECTION, SOLUTION INTRAVASCULAR
Status: COMPLETED | OUTPATIENT
Start: 2025-07-22 | End: 2025-07-22

## 2025-07-22 RX ORDER — TRAMADOL HYDROCHLORIDE 50 MG/1
50 TABLET ORAL EVERY 6 HOURS PRN
Qty: 12 TABLET | Refills: 0 | Status: SHIPPED | OUTPATIENT
Start: 2025-07-22

## 2025-07-22 RX ORDER — HYDROMORPHONE HYDROCHLORIDE 1 MG/ML
0.5 INJECTION, SOLUTION INTRAMUSCULAR; INTRAVENOUS; SUBCUTANEOUS ONCE
Refills: 0 | Status: COMPLETED | OUTPATIENT
Start: 2025-07-22 | End: 2025-07-22

## 2025-07-22 RX ORDER — SODIUM CHLORIDE 0.9 % (FLUSH) 0.9 %
10 SYRINGE (ML) INJECTION AS NEEDED
Status: DISCONTINUED | OUTPATIENT
Start: 2025-07-22 | End: 2025-07-22 | Stop reason: HOSPADM

## 2025-07-22 RX ORDER — ONDANSETRON 2 MG/ML
4 INJECTION INTRAMUSCULAR; INTRAVENOUS ONCE
Status: COMPLETED | OUTPATIENT
Start: 2025-07-22 | End: 2025-07-22

## 2025-07-22 RX ADMIN — HYDROMORPHONE HYDROCHLORIDE 0.5 MG: 1 INJECTION, SOLUTION INTRAMUSCULAR; INTRAVENOUS; SUBCUTANEOUS at 14:31

## 2025-07-22 RX ADMIN — MORPHINE SULFATE 4 MG: 4 INJECTION, SOLUTION INTRAMUSCULAR; INTRAVENOUS at 11:03

## 2025-07-22 RX ADMIN — ONDANSETRON 4 MG: 2 INJECTION, SOLUTION INTRAMUSCULAR; INTRAVENOUS at 11:03

## 2025-07-22 RX ADMIN — IOPAMIDOL 100 ML: 755 INJECTION, SOLUTION INTRAVENOUS at 11:37
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
